# Patient Record
Sex: FEMALE | Race: BLACK OR AFRICAN AMERICAN | NOT HISPANIC OR LATINO | Employment: OTHER | URBAN - METROPOLITAN AREA
[De-identification: names, ages, dates, MRNs, and addresses within clinical notes are randomized per-mention and may not be internally consistent; named-entity substitution may affect disease eponyms.]

---

## 2017-04-27 ENCOUNTER — HISTORICAL (OUTPATIENT)
Dept: LAB | Facility: HOSPITAL | Age: 67
End: 2017-04-27

## 2017-05-31 ENCOUNTER — HISTORICAL (OUTPATIENT)
Dept: INTERNAL MEDICINE | Facility: CLINIC | Age: 67
End: 2017-05-31

## 2017-08-16 ENCOUNTER — HISTORICAL (OUTPATIENT)
Dept: RADIOLOGY | Facility: HOSPITAL | Age: 67
End: 2017-08-16

## 2017-08-16 LAB
BUN SERPL-MCNC: 27 MG/DL (ref 7–18)
CALCIUM SERPL-MCNC: 9.5 MG/DL (ref 8.5–10.1)
CHLORIDE SERPL-SCNC: 108 MMOL/L (ref 98–107)
CO2 SERPL-SCNC: 25 MMOL/L (ref 21–32)
CREAT SERPL-MCNC: 1.7 MG/DL (ref 0.6–1.3)
GLUCOSE SERPL-MCNC: 86 MG/DL (ref 74–106)
POTASSIUM SERPL-SCNC: 4.6 MMOL/L (ref 3.5–5.1)
SODIUM SERPL-SCNC: 143 MMOL/L (ref 136–145)
URATE SERPL-MCNC: 4.9 MG/DL (ref 2.6–6)

## 2017-08-29 ENCOUNTER — HISTORICAL (OUTPATIENT)
Dept: INTERNAL MEDICINE | Facility: CLINIC | Age: 67
End: 2017-08-29

## 2017-08-29 LAB
BUN SERPL-MCNC: 26 MG/DL (ref 7–18)
CALCIUM SERPL-MCNC: 9 MG/DL (ref 8.5–10.1)
CHLORIDE SERPL-SCNC: 106 MMOL/L (ref 98–107)
CO2 SERPL-SCNC: 28 MMOL/L (ref 21–32)
CREAT SERPL-MCNC: 1.4 MG/DL (ref 0.6–1.3)
GLUCOSE SERPL-MCNC: 86 MG/DL (ref 74–106)
POTASSIUM SERPL-SCNC: 4.4 MMOL/L (ref 3.5–5.1)
SODIUM SERPL-SCNC: 139 MMOL/L (ref 136–145)

## 2017-10-31 ENCOUNTER — HISTORICAL (OUTPATIENT)
Dept: RADIOLOGY | Facility: HOSPITAL | Age: 67
End: 2017-10-31

## 2017-12-12 ENCOUNTER — HISTORICAL (OUTPATIENT)
Dept: ENDOSCOPY | Facility: HOSPITAL | Age: 67
End: 2017-12-12

## 2017-12-12 LAB — CRC RECOMMENDATION EXT: NORMAL

## 2017-12-18 ENCOUNTER — HISTORICAL (OUTPATIENT)
Dept: ADMINISTRATIVE | Facility: HOSPITAL | Age: 67
End: 2017-12-18

## 2017-12-18 LAB
ALBUMIN SERPL-MCNC: 3 GM/DL (ref 3.4–5)
ALBUMIN/GLOB SERPL: 1 RATIO (ref 1–2)
ALP SERPL-CCNC: 72 UNIT/L (ref 45–117)
ALT SERPL-CCNC: 14 UNIT/L (ref 12–78)
AST SERPL-CCNC: 11 UNIT/L (ref 15–37)
BILIRUB SERPL-MCNC: 0.2 MG/DL (ref 0.2–1)
BILIRUBIN DIRECT+TOT PNL SERPL-MCNC: <0.1 MG/DL
BILIRUBIN DIRECT+TOT PNL SERPL-MCNC: ABNORMAL MG/DL
BUN SERPL-MCNC: 19 MG/DL (ref 7–18)
CALCIUM SERPL-MCNC: 8.6 MG/DL (ref 8.5–10.1)
CHLORIDE SERPL-SCNC: 108 MMOL/L (ref 98–107)
CO2 SERPL-SCNC: 26 MMOL/L (ref 21–32)
CREAT SERPL-MCNC: 1.1 MG/DL (ref 0.6–1.3)
ERYTHROCYTE [DISTWIDTH] IN BLOOD BY AUTOMATED COUNT: 14.2 % (ref 11.5–14.5)
GLOBULIN SER-MCNC: 4.5 GM/ML (ref 2.3–3.5)
GLUCOSE SERPL-MCNC: 78 MG/DL (ref 74–106)
HAV IGM SERPL QL IA: NONREACTIVE
HBV CORE IGM SERPL QL IA: NONREACTIVE
HBV SURFACE AG SERPL QL IA: NEGATIVE
HCT VFR BLD AUTO: 34.3 % (ref 35–46)
HCV AB SERPL QL IA: NONREACTIVE
HGB BLD-MCNC: 10.5 GM/DL (ref 12–16)
MCH RBC QN AUTO: 26.4 PG (ref 26–34)
MCHC RBC AUTO-ENTMCNC: 30.6 GM/DL (ref 31–37)
MCV RBC AUTO: 86.2 FL (ref 80–100)
PLATELET # BLD AUTO: 294 X10(3)/MCL (ref 130–400)
PMV BLD AUTO: 10.6 FL (ref 7.4–10.4)
POTASSIUM SERPL-SCNC: 3.7 MMOL/L (ref 3.5–5.1)
PROT SERPL-MCNC: 7.5 GM/DL (ref 6.4–8.2)
RBC # BLD AUTO: 3.98 X10(6)/MCL (ref 4–5.2)
SODIUM SERPL-SCNC: 143 MMOL/L (ref 136–145)
T PALLIDUM AB SER QL: NONREACTIVE
WBC # SPEC AUTO: 5.7 X10(3)/MCL (ref 4.5–11)

## 2017-12-21 ENCOUNTER — HISTORICAL (OUTPATIENT)
Dept: SURGERY | Facility: HOSPITAL | Age: 67
End: 2017-12-21

## 2018-05-21 ENCOUNTER — HISTORICAL (OUTPATIENT)
Dept: WOUND CARE | Facility: HOSPITAL | Age: 68
End: 2018-05-21

## 2018-07-02 ENCOUNTER — HISTORICAL (OUTPATIENT)
Dept: INTERNAL MEDICINE | Facility: CLINIC | Age: 68
End: 2018-07-02

## 2018-07-02 LAB
ABS NEUT (OLG): 2.33 X10(3)/MCL (ref 2.1–9.2)
ALBUMIN SERPL-MCNC: 3.3 GM/DL (ref 3.4–5)
ALBUMIN/GLOB SERPL: 1 RATIO (ref 1–2)
ALP SERPL-CCNC: 71 UNIT/L (ref 45–117)
ALT SERPL-CCNC: 13 UNIT/L (ref 12–78)
AST SERPL-CCNC: 17 UNIT/L (ref 15–37)
BASOPHILS # BLD AUTO: 0.03 X10(3)/MCL
BASOPHILS NFR BLD AUTO: 1 %
BILIRUB SERPL-MCNC: 0.3 MG/DL (ref 0.2–1)
BILIRUBIN DIRECT+TOT PNL SERPL-MCNC: 0.1 MG/DL
BILIRUBIN DIRECT+TOT PNL SERPL-MCNC: 0.2 MG/DL
BUN SERPL-MCNC: 16 MG/DL (ref 7–18)
CALCIUM SERPL-MCNC: 8.8 MG/DL (ref 8.5–10.1)
CHLORIDE SERPL-SCNC: 108 MMOL/L (ref 98–107)
CHOLEST SERPL-MCNC: 169 MG/DL
CHOLEST/HDLC SERPL: 3.1 {RATIO} (ref 0–4.4)
CO2 SERPL-SCNC: 26 MMOL/L (ref 21–32)
CREAT SERPL-MCNC: 1.2 MG/DL (ref 0.6–1.3)
EOSINOPHIL # BLD AUTO: 0.06 X10(3)/MCL
EOSINOPHIL NFR BLD AUTO: 1 %
ERYTHROCYTE [DISTWIDTH] IN BLOOD BY AUTOMATED COUNT: 15.1 % (ref 11.5–14.5)
GLOBULIN SER-MCNC: 4.5 GM/ML (ref 2.3–3.5)
GLUCOSE SERPL-MCNC: 69 MG/DL (ref 74–106)
HCT VFR BLD AUTO: 39.6 % (ref 35–46)
HDLC SERPL-MCNC: 55 MG/DL
HGB BLD-MCNC: 12.3 GM/DL (ref 12–16)
IMM GRANULOCYTES # BLD AUTO: 0.01 10*3/UL
IMM GRANULOCYTES NFR BLD AUTO: 0 %
LDLC SERPL CALC-MCNC: 93 MG/DL (ref 0–130)
LYMPHOCYTES # BLD AUTO: 2.23 X10(3)/MCL
LYMPHOCYTES NFR BLD AUTO: 45 % (ref 13–40)
MCH RBC QN AUTO: 26.9 PG (ref 26–34)
MCHC RBC AUTO-ENTMCNC: 31.1 GM/DL (ref 31–37)
MCV RBC AUTO: 86.5 FL (ref 80–100)
MONOCYTES # BLD AUTO: 0.34 X10(3)/MCL
MONOCYTES NFR BLD AUTO: 7 % (ref 4–12)
NEUTROPHILS # BLD AUTO: 2.33 X10(3)/MCL
NEUTROPHILS NFR BLD AUTO: 47 X10(3)/MCL
PLATELET # BLD AUTO: 264 X10(3)/MCL (ref 130–400)
PMV BLD AUTO: 10.1 FL (ref 7.4–10.4)
POTASSIUM SERPL-SCNC: 4 MMOL/L (ref 3.5–5.1)
PROT SERPL-MCNC: 7.8 GM/DL (ref 6.4–8.2)
RBC # BLD AUTO: 4.58 X10(6)/MCL (ref 4–5.2)
SODIUM SERPL-SCNC: 141 MMOL/L (ref 136–145)
TRIGL SERPL-MCNC: 105 MG/DL
URATE SERPL-MCNC: 4.2 MG/DL (ref 2.6–6)
VLDLC SERPL CALC-MCNC: 21 MG/DL
WBC # SPEC AUTO: 5 X10(3)/MCL (ref 4.5–11)

## 2018-08-01 ENCOUNTER — HISTORICAL (OUTPATIENT)
Dept: RADIOLOGY | Facility: HOSPITAL | Age: 68
End: 2018-08-01

## 2018-08-31 ENCOUNTER — HISTORICAL (OUTPATIENT)
Dept: WOUND CARE | Facility: HOSPITAL | Age: 68
End: 2018-08-31

## 2019-01-15 ENCOUNTER — HISTORICAL (OUTPATIENT)
Dept: INTERNAL MEDICINE | Facility: CLINIC | Age: 69
End: 2019-01-15

## 2019-01-15 LAB
ABS NEUT (OLG): 2.26 X10(3)/MCL (ref 2.1–9.2)
ALBUMIN SERPL-MCNC: 3.3 GM/DL (ref 3.4–5)
ALBUMIN/GLOB SERPL: 1 RATIO (ref 1–2)
ALP SERPL-CCNC: 84 UNIT/L (ref 45–117)
ALT SERPL-CCNC: 17 UNIT/L (ref 12–78)
AST SERPL-CCNC: 22 UNIT/L (ref 15–37)
BASOPHILS # BLD AUTO: 0.02 X10(3)/MCL
BASOPHILS NFR BLD AUTO: 0 %
BILIRUB SERPL-MCNC: 0.5 MG/DL (ref 0.2–1)
BILIRUBIN DIRECT+TOT PNL SERPL-MCNC: 0.2 MG/DL
BILIRUBIN DIRECT+TOT PNL SERPL-MCNC: 0.3 MG/DL
BUN SERPL-MCNC: 20 MG/DL (ref 7–18)
CALCIUM SERPL-MCNC: 8.8 MG/DL (ref 8.5–10.1)
CHLORIDE SERPL-SCNC: 107 MMOL/L (ref 98–107)
CHOLEST SERPL-MCNC: 196 MG/DL
CHOLEST/HDLC SERPL: 3.4 {RATIO} (ref 0–4.4)
CO2 SERPL-SCNC: 27 MMOL/L (ref 21–32)
CREAT SERPL-MCNC: 1.2 MG/DL (ref 0.6–1.3)
EOSINOPHIL # BLD AUTO: 0.12 10*3/UL
EOSINOPHIL NFR BLD AUTO: 2 %
ERYTHROCYTE [DISTWIDTH] IN BLOOD BY AUTOMATED COUNT: 13.6 % (ref 11.5–14.5)
GLOBULIN SER-MCNC: 5 GM/ML (ref 2.3–3.5)
GLUCOSE SERPL-MCNC: 98 MG/DL (ref 74–106)
HCT VFR BLD AUTO: 43.4 % (ref 35–46)
HDLC SERPL-MCNC: 57 MG/DL
HGB BLD-MCNC: 13.5 GM/DL (ref 12–16)
IMM GRANULOCYTES # BLD AUTO: 0.01 10*3/UL
IMM GRANULOCYTES NFR BLD AUTO: 0 %
LDLC SERPL CALC-MCNC: 119 MG/DL (ref 0–130)
LYMPHOCYTES # BLD AUTO: 2.54 X10(3)/MCL
LYMPHOCYTES NFR BLD AUTO: 48 % (ref 13–40)
MCH RBC QN AUTO: 27.2 PG (ref 26–34)
MCHC RBC AUTO-ENTMCNC: 31.1 GM/DL (ref 31–37)
MCV RBC AUTO: 87.3 FL (ref 80–100)
MONOCYTES # BLD AUTO: 0.32 X10(3)/MCL
MONOCYTES NFR BLD AUTO: 6 % (ref 4–12)
NEUTROPHILS # BLD AUTO: 2.26 X10(3)/MCL
NEUTROPHILS NFR BLD AUTO: 43 X10(3)/MCL
PLATELET # BLD AUTO: 289 X10(3)/MCL (ref 130–400)
PMV BLD AUTO: 10 FL (ref 7.4–10.4)
POTASSIUM SERPL-SCNC: 4 MMOL/L (ref 3.5–5.1)
PROT SERPL-MCNC: 8.3 GM/DL (ref 6.4–8.2)
RBC # BLD AUTO: 4.97 X10(6)/MCL (ref 4–5.2)
SODIUM SERPL-SCNC: 140 MMOL/L (ref 136–145)
TRIGL SERPL-MCNC: 100 MG/DL
VLDLC SERPL CALC-MCNC: 20 MG/DL
WBC # SPEC AUTO: 5.3 X10(3)/MCL (ref 4.5–11)

## 2019-05-06 ENCOUNTER — HISTORICAL (OUTPATIENT)
Dept: ADMINISTRATIVE | Facility: HOSPITAL | Age: 69
End: 2019-05-06

## 2019-08-07 ENCOUNTER — HISTORICAL (OUTPATIENT)
Dept: INTERNAL MEDICINE | Facility: CLINIC | Age: 69
End: 2019-08-07

## 2019-08-07 LAB
ABS NEUT (OLG): 3.32 X10(3)/MCL (ref 2.1–9.2)
ALBUMIN SERPL-MCNC: 3.3 GM/DL (ref 3.4–5)
ALBUMIN/GLOB SERPL: 0.8 RATIO (ref 1.1–2)
ALP SERPL-CCNC: 74 UNIT/L (ref 45–117)
ALT SERPL-CCNC: 15 UNIT/L (ref 12–78)
AST SERPL-CCNC: 20 UNIT/L (ref 15–37)
BASOPHILS # BLD AUTO: 0.03 X10(3)/MCL
BASOPHILS NFR BLD AUTO: 0 %
BILIRUB SERPL-MCNC: 0.4 MG/DL (ref 0.2–1)
BILIRUBIN DIRECT+TOT PNL SERPL-MCNC: 0.1 MG/DL
BILIRUBIN DIRECT+TOT PNL SERPL-MCNC: 0.3 MG/DL
BUN SERPL-MCNC: 19 MG/DL (ref 7–18)
CALCIUM SERPL-MCNC: 9.4 MG/DL (ref 8.5–10.1)
CHLORIDE SERPL-SCNC: 110 MMOL/L (ref 98–107)
CHOLEST SERPL-MCNC: 201 MG/DL
CHOLEST/HDLC SERPL: 2.9 {RATIO} (ref 0–4.4)
CO2 SERPL-SCNC: 27 MMOL/L (ref 21–32)
CREAT SERPL-MCNC: 1.2 MG/DL (ref 0.6–1.3)
EOSINOPHIL # BLD AUTO: 0.07 X10(3)/MCL
EOSINOPHIL NFR BLD AUTO: 1 %
ERYTHROCYTE [DISTWIDTH] IN BLOOD BY AUTOMATED COUNT: 14.6 % (ref 11.5–14.5)
GLOBULIN SER-MCNC: 4.2 GM/ML (ref 2.3–3.5)
GLUCOSE SERPL-MCNC: 101 MG/DL (ref 74–106)
HCT VFR BLD AUTO: 42 % (ref 35–46)
HDLC SERPL-MCNC: 70 MG/DL
HGB BLD-MCNC: 12.7 GM/DL (ref 12–16)
IMM GRANULOCYTES # BLD AUTO: 0.01 10*3/UL
IMM GRANULOCYTES NFR BLD AUTO: 0 %
LDLC SERPL CALC-MCNC: 116 MG/DL (ref 0–130)
LYMPHOCYTES # BLD AUTO: 2.49 X10(3)/MCL
LYMPHOCYTES NFR BLD AUTO: 39 % (ref 13–40)
MCH RBC QN AUTO: 28.2 PG (ref 26–34)
MCHC RBC AUTO-ENTMCNC: 30.2 GM/DL (ref 31–37)
MCV RBC AUTO: 93.3 FL (ref 80–100)
MONOCYTES # BLD AUTO: 0.44 X10(3)/MCL
MONOCYTES NFR BLD AUTO: 7 % (ref 0–24)
NEUTROPHILS # BLD AUTO: 3.32 X10(3)/MCL
NEUTROPHILS NFR BLD AUTO: 52 X10(3)/MCL
PLATELET # BLD AUTO: 265 X10(3)/MCL (ref 130–400)
PMV BLD AUTO: 9.6 FL (ref 7.4–10.4)
POTASSIUM SERPL-SCNC: 4.2 MMOL/L (ref 3.5–5.1)
PROT SERPL-MCNC: 7.5 GM/DL (ref 6.4–8.2)
RBC # BLD AUTO: 4.5 X10(6)/MCL (ref 4–5.2)
SODIUM SERPL-SCNC: 141 MMOL/L (ref 136–145)
TRIGL SERPL-MCNC: 76 MG/DL
VLDLC SERPL CALC-MCNC: 15 MG/DL
WBC # SPEC AUTO: 6.4 X10(3)/MCL (ref 4.5–11)

## 2019-09-24 ENCOUNTER — HISTORICAL (OUTPATIENT)
Dept: RADIOLOGY | Facility: HOSPITAL | Age: 69
End: 2019-09-24

## 2020-12-03 ENCOUNTER — HISTORICAL (OUTPATIENT)
Dept: ADMINISTRATIVE | Facility: HOSPITAL | Age: 70
End: 2020-12-03

## 2020-12-03 LAB
BUN SERPL-MCNC: 17 MG/DL (ref 9.8–20.1)
CALCIUM SERPL-MCNC: 9.1 MG/DL (ref 8.4–10.2)
CHLORIDE SERPL-SCNC: 103 MMOL/L (ref 98–107)
CO2 SERPL-SCNC: 25 MMOL/L (ref 23–31)
CREAT SERPL-MCNC: 1.06 MG/DL (ref 0.55–1.02)
CREAT/UREA NIT SERPL: 16
GLUCOSE SERPL-MCNC: 85 MG/DL (ref 82–115)
POTASSIUM SERPL-SCNC: 4.3 MMOL/L (ref 3.5–5.1)
SODIUM SERPL-SCNC: 134 MMOL/L (ref 136–145)

## 2021-06-22 ENCOUNTER — HISTORICAL (OUTPATIENT)
Dept: RADIOLOGY | Facility: HOSPITAL | Age: 71
End: 2021-06-22

## 2021-07-06 ENCOUNTER — HISTORICAL (OUTPATIENT)
Dept: CARDIOLOGY | Facility: CLINIC | Age: 71
End: 2021-07-06

## 2021-07-06 LAB
ALBUMIN SERPL-MCNC: 3.4 GM/DL (ref 3.4–4.8)
ALBUMIN/GLOB SERPL: 0.8 RATIO (ref 1.1–2)
ALP SERPL-CCNC: 74 UNIT/L (ref 40–150)
ALT SERPL-CCNC: 14 UNIT/L (ref 0–55)
AST SERPL-CCNC: 23 UNIT/L (ref 5–34)
BILIRUB SERPL-MCNC: 0.6 MG/DL
BILIRUBIN DIRECT+TOT PNL SERPL-MCNC: 0.2 MG/DL (ref 0–0.5)
BILIRUBIN DIRECT+TOT PNL SERPL-MCNC: 0.4 MG/DL (ref 0–0.8)
BUN SERPL-MCNC: 15.8 MG/DL (ref 9.8–20.1)
CALCIUM SERPL-MCNC: 10 MG/DL (ref 8.4–10.2)
CHLORIDE SERPL-SCNC: 108 MMOL/L (ref 98–107)
CHOLEST SERPL-MCNC: 219 MG/DL
CHOLEST/HDLC SERPL: 3 {RATIO} (ref 0–5)
CO2 SERPL-SCNC: 23 MMOL/L (ref 23–31)
CREAT SERPL-MCNC: 0.98 MG/DL (ref 0.55–1.02)
GLOBULIN SER-MCNC: 4.5 GM/DL (ref 2.4–3.5)
GLUCOSE SERPL-MCNC: 96 MG/DL (ref 82–115)
HDLC SERPL-MCNC: 63 MG/DL (ref 35–60)
LDLC SERPL CALC-MCNC: 132 MG/DL (ref 50–140)
POTASSIUM SERPL-SCNC: 3.6 MMOL/L (ref 3.5–5.1)
PROT SERPL-MCNC: 7.9 GM/DL (ref 5.8–7.6)
SODIUM SERPL-SCNC: 143 MMOL/L (ref 136–145)
TRIGL SERPL-MCNC: 119 MG/DL (ref 37–140)
VLDLC SERPL CALC-MCNC: 24 MG/DL

## 2022-03-24 ENCOUNTER — HISTORICAL (OUTPATIENT)
Dept: ADMINISTRATIVE | Facility: HOSPITAL | Age: 72
End: 2022-03-24

## 2022-04-11 ENCOUNTER — HISTORICAL (OUTPATIENT)
Dept: ADMINISTRATIVE | Facility: HOSPITAL | Age: 72
End: 2022-04-11

## 2022-04-27 VITALS
HEIGHT: 64 IN | BODY MASS INDEX: 40.49 KG/M2 | SYSTOLIC BLOOD PRESSURE: 136 MMHG | WEIGHT: 237.19 LBS | DIASTOLIC BLOOD PRESSURE: 72 MMHG | OXYGEN SATURATION: 96 %

## 2022-04-30 NOTE — PROGRESS NOTES
Patient:   Shelby Larios            MRN: [id number]            FIN: [id number]               Age:   67 years     Sex:  Female     :  1950   Associated Diagnoses:   None   Author:   Lars EVANS, Jessica Conway reviewed: Will discuss with patient during follow up appointment on  2018 at 7:30am.

## 2022-04-30 NOTE — OP NOTE
Patient:   Shelby Larios            MRN: [id number]            FIN: [id number]               Age:   67 years     Sex:  Female     :  1950   Associated Diagnoses:   None   Author:   Jj Garrison MD      Procedure   Colonoscopy procedure   Physical Exam: vital signs Vital Signs   2017 7:47 CST      Temperature Oral          36.6 DegC                             Temperature Oral (calculated)             97.88 DegF                             Peripheral Pulse Rate     52 bpm  LOW                             Respiratory Rate          20 br/min                             SpO2                      99 %                             Oxygen Therapy            Room air                             Systolic Blood Pressure   193 mmHg  HI                             Diastolic Blood Pressure  98 mmHg  HI                             Mean Arterial Pressure, Cuff              130 mmHg     .        Impression and Plan   DATE OF PROCEDURE:  17    PATIENT NAME: Shelby Larios    MRN: [id number]  PREOPERATIVE DIAGNOSIS:  Rectal bleeding  POSTOPERATIVE DIAGNOSIS:  internal hemorrhoids, colon polyps  PROCEDURE PERFORMED:    Colonoscopy  ENDOSCOPIST:  Jared Nesbitt MD  ASSISTANT:  Jj Garrison MD  ANESTHESIA:  Deep Sedation  EXTENT OF EXAM:  To the cecum.  EBL: none  PREPARATION:  Good  LIMITATIONS:  None.  INDICATIONS FOR EXAMINATION:  The patient is a 68yo F Hx rectal bleeding, here for colonoscopy  PROCEDURE IN DETAIL:  A physical examination was performed. The major risks and benefits associated with the procedure were explained to the patient in detail. The patient verbalized understanding and agreement with the same.  The patient was connected to the appropriate monitoring devices and an IV was started. Continuous oxygen was provided via nasal cannula and intravenous sedation was administered in divided doses throughout the procedure.   After adequate sedation was achieved, the patient was placed in the left  lateral decubitus position and a digital rectal exam was performed. This examination was within normal limits. A well-lubricated colonoscope was then inserted into the rectum and advanced under direct visualization to the level of the cecum. The cecum was identified by both visual and anatomic landmarks. A photograph was taken of the cecal cap, as well as the intussuscepting ileum. The scope was then fully withdrawn while examining the color, texture, anatomy and integrity of the mucosa from the cecum to the anal canal. The findings were consistent with colon polyps, 1 at 100cm removed by cold forceps, ~1cm. Multiple small <0.5cm polyps seen in rectum ~15cm, cold forceps Biopsy performed. The scope was then retroflexed to view the dentate line. Internal hemorrhoids. The scope was completely retrieved upon exiting the anal canal and the procedure was terminated. The patient was then transferred to the recovery room in stable condition.  ENDOSCOPIC DIAGNOSIS:  internal hemorrhoids, colon polyps  RECOMMENDATIONS:  Follow up in 5-10 years for repeat colonoscopy. F/u 2 weeks for Bx results.

## 2022-05-04 NOTE — HISTORICAL [facility] CERNER
This is a historical note converted from [employer]. Formatting and pictures may have been removed.  Please reference [employer] for original formatting and attached multimedia. Admission Information  66yo  with postmenopausal bleeding, 12mm endometrial stripe on pelvic ultrasound, and an endometrial biopsy showing proliferative tissue.  Hospital Course  Significant Findings  EUA: Morbidly obese female, normal external genitalia without lesions or masses. EUA revealed moderate vaginal capacity and a?large, anterior, multiparous?cervix overall normal to palpation. Uterus anteverted, ~7 weeks in size, mobile. No palpable adnexal masses but exam limited by body habitus.?  ?   Hysteroscope: normal appearing endocervical canal. Endometrial cavity with disordered proliferative and secretory-appearing endometrium with micropolyps noted throughout the cavity.?Both ostia visualized.  Procedures and Treatment Provided  Patient underwent uncomplicated Hysteroscopy Dilation and Curettage with Mirena IUD Insertion. She was awakened from anesthesia and was discharged home after meeting all criteria.  Physical Exam  Vitals & Measurements  T:?37.4? ?C ?(Axillary)? TMIN:?36? ?C ?(Temporal Artery)? TMAX:?37.4? ?C ?(Axillary)? HR:?47?(Monitored)? RR:?14? BP:?130/73? SpO2:?100%? WT:?103.1?kg?  General: NAD, A/O x3  Lungs: CTAB, non-labored breathing  CV: RRR, extremities well-perfused  Ab: soft, non-tender  : no vaginal bleeding or discharge  Ext: well-perfused, calves non-tender bilaterally  Discharge Plan  --Mirena IUD in place for treatment of postmenopausal bleeding.  --Discharge medications: Ibuprofen, Percocet  --Post-op appointment scheduled for 2018 at 0830. Pathology will be discussed at that time.  Postmenopausal bleeding  Orders:  levonorgestrel, 52 mg, form: Device, Intrauteral, Once-Unscheduled, first dose 17 6:51:00 CST, [id number]  Pathology Tissue [facility], 17 9:13:00 CST, AP Specimen, 68 yo  with  postmenopausal bleeding presents for preoperative assessment. Bleeding started in June/July with light spotting, increased to 2-3 pads/day for two weeks in September, then taperd off to light spotting...  Patient Discharge Condition  stable for discharge home

## 2022-05-04 NOTE — HISTORICAL [facility] CERNER
This is a historical note converted from [employer]. Formatting and pictures may have been removed.  Please reference [employer] for original formatting and attached multimedia. Patient:?Shelby Larios (MRN [id number])  ?  HPI:?68 yo  with postmenopausal bleeding presents for preoperative assessment.?Bleeding started?in /July?with light spotting, increased to 2-3 pads/day for two weeks in September, then taperd off to light spotting again. She has been evaluated with a pelvic ultrasound, demonstrating a 12mm endometrial stripe; and an endometrial biopsy, which was proliferative with no evidence of malignancy. She has had continued spotting to light bleeding since the endometrial biopsy.  ?   Discussed patients CAD and functional status:?denies chest pain at rest or with exertion. Able to participate in activities of daily living without issue. Can walk 2-3 city blocks prior to feeling winded. Cannot climb flight of stairs without becoming short of breath.  ?  PMH:?HTN, CKD,?Gout, VIC, CAD,?Class III?Obesity?(BMI 42)  PSH:?BTL  OBHx:? x 6 (BB <5#), patient reports some  and some term deliveries  GynHx:?LMP early 40s;?denies history of abnormal pap or STI  FHx:?Mother with history of MI, colon cancer at age 68. Father with HTN and throat?cancer.?Sister with throat cancer. Denies family history of breast, ovarian, or endometrial cancer  Social:?social EtOH on weekends, denies tobacco or illicit drug use  All:?NKDA  Meds: allopurinol, atenolol, biotin, losartan  ?  Objective:  T: 36.8? BP: 190/82? P: 66? R: 20? Wt: 104 kg? Ht: 157 cm ?BMI:?42  NAD, A&O x 3  RRR  CTAB  Abd soft, nondistended, nontender, normoactive bowel sounds   Normal external genitalia, vulva without lesions. Normal appearing urethral meatus. Normal appearing cervix without lesions. No discharge or blood in the vaginal vault. No CMT, no bilateral adnexal fullness/tenderness. 12 week size mobile uterus with excellent descent and capacity,  anteverted. Nontender on examination.  ?  Labs:?  Pap?no longer indicated  EMB?Proliferative phase endometrium with progesterone effect.?No evidence of malignancy.?(2017)  MMG?BIRADS 2 (2017)  CXR ordered?  A1c 5.5 (2017)  Cr 1.2 (10/2017)  H/H 12.1/38 (10/2017)  Scheduled for colonoscopy   ?  Imaging:?  US Pelvis 10/31/2017  Imaging is limited on the transabdominal images by incomplete filling?of the urinary bladder to provide an acoustic window. The uterus?measures 6.7 x 4.0 x 4.3 cm. There is significant thickening of the?endometrial stripe for patient age and postmenopausal status measuring?12 mm. Considerations for this finding include endometrial hyperplasia?or possible neoplasm. The left ovary appears unremarkable containing?small follicular cysts. The right ovary was not visualized. No free pelvic fluid is identified.  IMPRESSION:  1. Thickening of the endometrial stripe for patient age measuring 12?mm. Findings indicate endometrial hyperplasia or possible neoplasm.  2. No free pelvic fluid or adnexal abnormalities are identified?although the right ovary was not?visualized.  ?  Assessment:?66 yo  with postmenopausal bleeding here for preoperative assessment.?  ?  Counseling:?Alternatives to this planned procedure were explained to the patient including expectant, medical and other surgical management. This procedure and its risks, reasons, benefits and complications (including injury to bowel, bladder, major blood vessel, ureter, bleeding, possibility of transfusion, infection, scarring, dyspareunia, erosion, further surgery, incontinence, failure of the procedure or fistula formation) were reviewed in detail. Additional risks specific to this patient and procedure include?uterine perforation with possible injury to viscera or vasculature, fluid overload, failure to diagnose causative pathology, possible diagnosis of cancer.?Patient counseled on risk of blood transfusion including but not  limited to allergic reaction, transmission HIV, Hep C 1:2million, transmission Hep B 1:250,000.?Patient counseled on concurrent placement of Mirena IUD for endometrial protection if endometrium is benign, for treatment if endometrial intraepithelial neoplasia is disagnosed. Discussed risks of Mirena placement, including perforation, malplacement, expulsion, need for further procedures or surgical removal. After counseling, patient desires Mirena placement at time of surgery.  ?  Plan:  - Surgical consents signed. Preop testing ordered. Surgery case requested. Instructions reviewed, including NPO after midnight. Patient given date and time for EAC appointment.  - Follow-up pre-op labs, including EKG and CXR  - Type and screen?on?morning of surgery.  - No antibiotics indicated for this procedure.?  - SCDs for DVT prophylaxis.  - Continue atenolol through AM of surgery  - Surgical plan: Hysteroscopy D&C and Mirena IUD placement?on 12/21/2017 with Dr. Salazar  - Postop appt: 1/5/2018 at 0830  ?  ?  Addendum by Martin EVANS, May S on December 11, 2017 11:58:04 CST (Verified)  I have seen this patient on 12/6/17?and agree with note above.  ? Has colonoscopy scheduled next week.  Informed consent obtained, specifically I reiterated the risks of: ?bleeding, uterine perforation, inability to obtain adequate sample, finding of malignancy, medical conditions such as heart attack or stroke due to having general anesthetic, or other unpredicted risks.??  Patient able to describe planned procedure in her own words.  Expected recovery time reviewed, as well as normal postoperative course.  ?   UPDATE:  Patient has been seen this morning. Since her pre-op visit, she was seen in medicine clinic on 12/18 and provided with a prednisone taper for an acute gout flare-up of her left ankle.?She did not take the prednisone this morning.?She reports that there have been no other changes in her medical history since the above H&P. She is  able to describe the planned procedure in her own words and all questions have been answered. The consents are signed and on the chart. Plan to proceed to the OR for a Hysteroscopy D&C and Mirena IUD Placement with Dr. Salazar.  ?   Katie Lopez MD  [facility], HO[facility]2   I have seen the patient today preoperatively and she is able to state procedure in her own words.? States she wishes everything was out.? We reviewed that uterus will remain, and she is agreeable to plan?for IUD placement.

## 2022-05-04 NOTE — HISTORICAL [facility] CERNER
This is a historical note converted from [employer]. Formatting and pictures may have been removed.  Please reference [employer] for original formatting and attached multimedia. Chief Complaint  Advance DJD bilat knee, pt requests CSI injection recieved last CSI in 2017  History of Present Illness  68?yo?RHD??[?female ]?nonsmoker??presents to?Ortho Clinic??for routine follow up?for??bilateral knee pain.??  BRENDA: none  Previously seen 01/2018?_.  Previous treatment:?CSI on 09/2017, referred to PT, patient did not go  Previous injuries:?recent falls due to knee giving out and ER visit with xrays  Current pain level: 1/10 (rated as?mild/)??without pain medication; pain has recently began to worsen over the last 3 months  Associated Symptoms:?no numbness or tingling/numbness;??no swelling/swelling noted;?no skin changes;?no weakness;?mild decrease in ROM  Current Activity Level: walks daily without any restrictions but fights through the knee pain  ?  Review of Systems  Constitutional: no fever, no chills, no weight loss  CV: no swelling, no edema  GI: no urinary retention, no urinary incontinence  : no fecal incontinence  Skin: no rash, no wound  Neuro: no numbness/tingling, no weakness, no saddle anesthesia  MSK: as above  Psych: no depression, no anxiety  Heme/Lymph: no easy bruising, no easy bleeding, no lymphadenopathy  ?  Physical Exam  Vitals & Measurements  T:?37.0? ?C (Oral)? HR:?79(Peripheral)? RR:?16? BP:?184/82?  HT:?158?cm? WT:?105.5?kg? BMI:?42.26?  General:? well developed; NAD,  at bedside  HEENT:? oral mucosa moist, no pharyngeal erythema, EOMI,  CV:? 2+ pulses on all 4 extremities  Resp:? non-labored breathing, symmetrical chest expansion bilaterally  Abd:? soft, no TTP,  MSK:? 5/5 strength in all 4 extremities, see below  Skin:? warm, dry, pink, no rashes or sores  Neuro:? AAOx4, appropriate mood and affect  ?  R Knee:  Inspection:?antalgic gait, full weightbearing, normal alignment,?mild swelling, no  erythema, no bruising, no atrophy  Palpation:?mild tenderness to palpation  ROM:?  Flexion (0-140):?90 degrees  Extension:?10 degrees  Crepitus: pos  Strength: Flexion 5/5, Extension 5/5  Neurovascular: 2+ distal pulse bilaterally, sensation intact  ?   Special Tests:  Ballotable Effusion: Negative  Fluid Wave: Negative  Patellar Compression: Negative  Medial Retinaculum: non tender  Lateral Retinaculum: non tender  Lachmans: Negative  Posterior Drawer: Negative  McMurrays: Negative  Varus Stress: no laxity  Valgus Stress: no laxity  ?   L Knee:  Inspection:?antalgic gait, full weightbearing, normal alignment, no swelling, no erythema, no bruising, no atrophy  Palpation: No tenderness to palpation?  ROM:?  Flexion (0-140):?100 degrees  Extension:?10 degrees  Crepitus: negative  Strength: Flexion 5/5, Extension 5/5  Neurovascular: 2+ distal pulse bilaterally, sensation intact  ?   Special Tests:  Ballotable Effusion: Negative  Fluid Wave: Negative  Patellar Compression: Negative  Medial Retinaculum: non tender  Lateral Retinaculum: non tender  Lachmans: Negative  Posterior Drawer: Negative  McMurrays: Negative  Apleys Compression: Negative  Varus Stress: no laxity  Valgus Stress: no laxity  ?  Assessment/Plan  DJD (degenerative joint disease) of knee?M17.10  -xrays? ordered and?interpreted by me ?with attending physician;?  -Activity: full weight bearing exercise as tolerated by patient  -PT prescription 3 times weekly for 12 weeks; Home exercise prescription provided; ICE/heat prn  -CSI in bilateral knees, see procedure note; patient consented prior to; neuro vascular exam normal after, patient tolerated well  -RTC in 6 months  ?  HTN (hypertension)?I10  ?184/82  Patient states she did not take meds this morning but states her BP is never controlled  encouraged patient to see pcp very soon to address BP control  Obesity?E66.9  ?discussed calorie restriction and importance of weight loss on slowing DJD  process  Encouraged exercise as tolerated by patient  RTC in?6 months?or sooner if pain returns   Problem List/Past Medical History  Ongoing  Chronic kidney disease  Gout  Hypertension  Morbid obesity  Vaginal bleeding problem  Historical  No qualifying data  Procedure/Surgical History  Extraction of Endometrium, Via Natural or Artificial Opening, Diagnostic (12/21/2017)  Hysteroscopic Dilation & Curettage (None) (12/21/2017)  Hysteroscopy, surgical; with sampling (biopsy) of endometrium and/or polypectomy, with or without D & C (12/21/2017)  Insertion of Contraceptive Device into Uterus, Via Natural or Artificial Opening Endoscopic (12/21/2017)  Insertion of intrauterine device (IUD) (12/21/2017)  Inspection of Uterus and Cervix, Via Natural or Artificial Opening Endoscopic (12/21/2017)  Biopsy Gastrointestinal (12/12/2017)  Colonoscopy (12/12/2017)  Colonoscopy, flexible; with biopsy, single or multiple (12/12/2017)  Excision of Rectum, Via Natural or Artificial Opening Endoscopic (12/12/2017)  Excision of Transverse Colon, Via Natural or Artificial Opening Endoscopic (12/12/2017)  HOSPITAL OBSERVATION SERVICE, PER HOUR (12/13/2015)  Bilateral tubal ligation (1971)   Medications  allopurinol 300 mg oral tablet, 300 mg= 1 tab(s), Oral, BID  amoxicillin-clavulanate 875 mg-125 mg oral tablet, 1 tab(s), Oral, BID,? ?Not Taking, Completed Rx  candesartan 32 mg oral tablet, 32 mg= 1 tab(s), Oral, Daily, 6 refills  hydrALAZINE 25 mg oral tablet, 25 mg= 1 tab(s), Oral, BID, 3 refills  Kenalog-40 injectable suspension, 40 mg= 1 mL, Intra-Articular, Once  Kenalog-40 injectable suspension, 40 mg= 1 mL, Intra-Articular, Once  Lidocaine 1% PF 5ml inj, 5 mL, Intra-Articular, Once  Lidocaine 1% PF 5ml inj, 5 mL, Intra-Articular, Once  metoprolol tartrate 25 mg oral tab, 25 mg= 1 tab(s), Oral, BID, 6 refills  traMADol 50 mg oral tablet, 50 mg= 1 tab(s), Oral, q6hr, PRN,? ?Not Taking, Completed Rx  Allergies  No Known  Allergies  Social History  Alcohol - Denies Alcohol Use, 2015  Current, Beer, 1-2 times per week, 01/15/2019  Employment/School  Retired, 2018  Exercise - Does not exercise, 2015  Self assessment: Fair condition., 09/10/2015  Home/Environment  Lives with Spouse. Living situation: Home/Independent. Alcohol abuse in household: No. Substance abuse in household: No. Smoker in household: No. Injuries/Abuse/Neglect in household: No. Feels unsafe at home: No. Safe place to go: Yes. Family/Friends available for support: Yes. Concern for family members at home: No. Major illness in household: No. Financial concerns: No. TV/Computer concerns: No., 01/15/2019  Nutrition/Health  Regular, Wants to lose weight: No. Sleeping concerns: No. Feels highly stressed: No., 01/15/2019  Sexual  Sexual orientation: Straight or heterosexual. Gender Identity Identifies as female., 2019  Substance Abuse - Denies Substance Abuse, 2015  Never, 2016  Tobacco - Denies Tobacco Use, 2015  Never (less than 100 in lifetime), N/A, 2019  Family History  : Mother and Father.  Diabetes mellitus type 2: Sister.  Heart attack: Mother.  Heart disease: Sister.  Heart failure.: Mother.  Hypertension.: Mother, Father and Sister.  Immunizations  Vaccine Date Status   tetanus/diphtheria/pertussis, acel(Tdap) 01/15/2019 Given   influenza virus vaccine, inactivated 01/15/2019 Given   influenza virus vaccine, inactivated 2017 Given   influenza virus vaccine, inactivated 2015 Given   pneumococcal 23-polyvalent vaccine 2015 Given   Health Maintenance  Health Maintenance  ???Pending?(in the next year)  ??? ??OverDue  ??? ? ? ?Pneumococcal Vaccine due??and every?  ??? ? ? ?Advance Directive due??19??and every 1??year(s)  ??? ??Due?  ??? ? ? ?Aspirin Therapy for CVD Prevention due??19??and every 1??year(s)  ??? ? ? ?Bone Density Screening due??19??Variable frequency  ??? ? ?  ?Coronary Artery Disease Maintenance-Antiplatelet Agent Prescribed due??05/06/19??and every?  ??? ? ? ?Coronary Artery Disease Maintenance-Lipid Lowering Therapy due??05/06/19??and every?  ??? ? ? ?Zoster Vaccine due??05/06/19??and every 100??year(s)  ??? ??Due In Future?  ??? ? ? ?Alcohol Misuse Screening not due until??01/01/20??and every 1??year(s)  ??? ? ? ?Cognitive Screening not due until??01/01/20??and every 1??year(s)  ??? ? ? ?Fall Risk Assessment not due until??01/01/20??and every 1??year(s)  ??? ? ? ?Functional Assessment not due until??01/01/20??and every 1??year(s)  ??? ? ? ?Geriatric Depression Screening not due until??01/01/20??and every 1??year(s)  ??? ? ? ?Obesity Screening not due until??01/01/20??and every 1??year(s)  ??? ? ? ?Hypertension Management-BMP not due until??01/15/20??and every 1??year(s)  ??? ? ? ?Hypertension Management-Blood Pressure not due until??05/05/20??and every 1??year(s)  ???Satisfied?(in the past 1 year)  ??? ??Satisfied?  ??? ? ? ?ADL Screening on??05/06/19.??Satisfied by Hypolite, Melissa F  ??? ? ? ?Alcohol Misuse Screening on??05/06/19.??Satisfied by Hypolite, Melissa F  ??? ? ? ?Blood Pressure Screening on??05/06/19.??Satisfied by Hypolite, Melissa F  ??? ? ? ?Body Mass Index Check on??05/06/19.??Satisfied by Hypolite, Melissa F  ??? ? ? ?Breast Cancer Screening ([facility]) on??08/31/18.??Satisfied by Shikha Ramos  ??? ? ? ?Cervical Cancer Screening on??05/10/18.??Satisfied by Elisabeth Silver.  ??? ? ? ?Cognitive Screening on??05/06/19.??Satisfied by Melissa Vuong F  ??? ? ? ?Depression Screening on??05/06/19.??Satisfied by Hypolite, Melissa F  ??? ? ? ?Diabetes Screening on??01/15/19.??Satisfied by Maryan Calvillo  ??? ? ? ?Fall Risk Assessment on??05/06/19.??Satisfied by Carolann Melissa F  ??? ? ? ?Functional Assessment on??01/15/19.??Satisfied by Opal Medina LPN  ??? ? ? ?Geriatric Depression Screening on??01/15/19.??Satisfied by Opal Medina LPN  ??? ? ?  ?Hypertension Management-Blood Pressure on??05/06/19.??Satisfied by Melissa Vuong  ??? ? ? ?Influenza Vaccine on??01/15/19.??Satisfied by Opal Medina LPN  ??? ? ? ?Lipid Screening on??01/15/19.??Satisfied by Maryan Calvillo  ??? ? ? ?Obesity Screening on??05/06/19.??Satisfied by eMlissa Vuong  ??? ? ? ?Tetanus Vaccine on??01/15/19.??Satisfied by Opal Medina LPN  ??? ??Postponed?  ??? ? ? ?Tetanus Vaccine on??07/25/18.??Recorded by Opal Medina LPN  ?  ?  Diagnostic Results  XR R Knee 5/6/19:  diffuse tricompartment OA changes. no acute fx or dislocation.  ?  XR L Knee 5/6/19:  diffuse tricompartment OA changes. no acute fx or dislocation.      Discussed with the resident at time of visit? Patient chart reviewed. Patient seen and evaluated at time of visit.?HPI, PE, and Assessment and Plan reviewed. Treatment plan is reasonable and appropriate.? All questions were answered.?Compliance with treatment plan is appropriate.  ?Radiology images independently reviewed and agree with radiologist. ?Radiology images independently reviewed and agree with resident.

## 2022-05-04 NOTE — HISTORICAL [facility] CERNER
This is a historical note converted from [employer]ner. Formatting and pictures may have been removed.  Please reference [employer]ner for original formatting and attached multimedia. Indication for Surgery: 66yo  with postmenopausal bleeding, 12mm endometrial stripe on pelvic ultrasound, and an endometrial biopsy showing proliferative tissue.  ?  Preoperative Diagnosis: postmenopausal bleeding, thickened endometrial stripe?  ?   Postoperative Diagnosis: postmenopausal bleeding, thickened endometrial stripe?  ?   Operation: Hysteroscopy Dilation and Curettage, Mirena IUD Insertion?  ?   Surgeon(s): RUDDY Lopez MD?  Assistant: Esther Pascal MD  Attending: Ayaka Salazar MD?  ?  Anesthesia: general?  ?  Estimated Blood Loss: <5mL?  Urine Output: 350mL  IV Fluids: 700mL  ?  Hysteroscopy Distension Media (normal saline):  In: 560mL  Out: 500mL  Deficit: 60mL?  ?  Findings:?  EUA: Morbidly obese female, normal external genitalia without lesions or masses. EUA revealed moderate vaginal capacity and a?large, anterior, multiparous?cervix overall normal to palpation. Uterus anteverted, ~7 weeks in size, mobile. No palpable adnexal masses but exam limited by body habitus.?  ?  Hysteroscope: normal appearing endocervical canal. Endometrial cavity with disordered proliferative and secretory-appearing endometrium with micopolyps noted throughout the cavity.?Both ostia visualized.  ?  Specimen: endometrial curettings  ?  Complications: none  ?  Technique:  The patient was taken to the operating room with IV fluids running. General anesthesia was obtained without difficulty. The patient was prepped and draped in the routine fashion in the dorsal lithotomy position with legs in Fabián type stirrups. Red rubber catheterization was performed and the bladder was drained.?  ?  Procedure:  A weighted speculum was inserted into the vagina and the cervix was visualized. A tenaculum was placed on the anterior aspect of the cervix. The  hysteroscope was introduced through the cervix under direct visualization. The uterine cavity was inspected with findings as listed above. The hysteroscope was removed. A sharp curette was introduced through the cervix and into the endometrial cavity, and the cavity was scraped in a clockwise fashion until scrapings were obtained from all four quadrants. The curette was then removed and scrapings were handed off to be sent to pathology. IUD was inserted to the uterine fundus and deployed without difficulty. The hysteroscope was placed back into the endometrial cavity and correct placement of IUD was confirmed. All instruments were removed from the vagina. Tenaculum sites were noted to be hemostatic.?  ?  Counts were reported to be correct times two. Dr. Salazar was present for the entire procedure. The patient was awakened from anesthesia and taken to the recovery room in a stable condition.  ?  Katie Lopez MD  [facility], [facility]  ?  ?   This certifies I was present during the entire period between opening and closing of the surgical field.

## 2022-11-02 ENCOUNTER — HOSPITAL ENCOUNTER (EMERGENCY)
Facility: HOSPITAL | Age: 72
Discharge: HOME | End: 2022-11-02
Attending: FAMILY MEDICINE
Payer: COMMERCIAL

## 2022-11-02 VITALS
DIASTOLIC BLOOD PRESSURE: 101 MMHG | HEART RATE: 86 BPM | TEMPERATURE: 98 F | HEIGHT: 62 IN | OXYGEN SATURATION: 100 % | WEIGHT: 209.44 LBS | BODY MASS INDEX: 38.54 KG/M2 | SYSTOLIC BLOOD PRESSURE: 162 MMHG | RESPIRATION RATE: 16 BRPM

## 2022-11-02 DIAGNOSIS — M25.562 BILATERAL CHRONIC KNEE PAIN: Primary | ICD-10-CM

## 2022-11-02 DIAGNOSIS — M25.561 BILATERAL CHRONIC KNEE PAIN: Primary | ICD-10-CM

## 2022-11-02 DIAGNOSIS — G89.29 BILATERAL CHRONIC KNEE PAIN: Primary | ICD-10-CM

## 2022-11-02 DIAGNOSIS — M19.90 ARTHRITIS: ICD-10-CM

## 2022-11-02 PROCEDURE — 99284 EMERGENCY DEPT VISIT MOD MDM: CPT | Mod: 25

## 2022-11-02 RX ORDER — DICLOFENAC SODIUM 10 MG/G
4 GEL TOPICAL DAILY
Qty: 450 G | Refills: 0 | OUTPATIENT
Start: 2022-11-02 | End: 2023-09-27

## 2022-11-02 RX ORDER — METHYLPREDNISOLONE 4 MG/1
TABLET ORAL
Qty: 1 EACH | Refills: 0 | Status: SHIPPED | OUTPATIENT
Start: 2022-11-02 | End: 2022-12-08 | Stop reason: ALTCHOICE

## 2022-11-02 NOTE — ED PROVIDER NOTES
Encounter Date: 11/2/2022       History     Chief Complaint   Patient presents with    Knee Pain     PT W CO CHRONIC KNEE PAIN > 6 MONTHS.  NO RECENT INJURY.        71 YO AAF in ER with complaints of non traumatic bilateral knee pain for over a year. Needs a handicap form filled out for the [employer]. Her PCP was Dr. Sousa but since she left pt has not gotten a new PCP. Denies fever, chills, chest pain, SOB, abdominal pain, N/V/D, HA or dizziness. No other complaints.     The history is provided by the patient.   Review of patient's allergies indicates:  No Known Allergies  Past Medical History:   Diagnosis Date    Hypertension      No past surgical history on file.  No family history on file.  Social History     Tobacco Use    Smoking status: Never    Smokeless tobacco: Never     Review of Systems   Constitutional:  Negative for chills and fever.   HENT:  Negative for congestion and sore throat.    Respiratory:  Negative for shortness of breath.    Cardiovascular:  Negative for chest pain.   Gastrointestinal:  Negative for abdominal pain, diarrhea, nausea and vomiting.   Genitourinary:  Negative for dysuria.   Musculoskeletal:  Positive for arthralgias and joint swelling. Negative for back pain.   Skin:  Negative for rash.   Neurological:  Negative for dizziness, weakness, light-headedness and headaches.   Hematological:  Does not bruise/bleed easily.   All other systems reviewed and are negative.    Physical Exam     Initial Vitals [11/02/22 1341]   BP Pulse Resp Temp SpO2   (!) 162/101 86 16 97.9 °F (36.6 °C) 100 %      MAP       --         Physical Exam    Nursing note and vitals reviewed.  Constitutional: She appears well-developed and well-nourished. She is not diaphoretic. No distress.   HENT:   Head: Normocephalic and atraumatic.   Mouth/Throat: Oropharynx is clear and moist.   Eyes: Conjunctivae are normal.   Cardiovascular:  Normal rate, regular rhythm, normal heart sounds and intact distal pulses.            Pulmonary/Chest: Breath sounds normal.   Musculoskeletal:         General: Tenderness present.      Right knee: Crepitus present. No swelling, deformity, erythema or ecchymosis. Normal range of motion. Tenderness present over the medial joint line and lateral joint line.      Left knee: Crepitus present. No swelling, deformity, erythema or ecchymosis. Normal range of motion. Tenderness present over the medial joint line and lateral joint line.     Neurological: She is alert and oriented to person, place, and time. She has normal strength.   Skin: Skin is warm and dry.   Psychiatric: She has a normal mood and affect.       ED Course   Procedures  Labs Reviewed - No data to display       Imaging Results    None          Medications - No data to display                           Clinical Impression:   Final diagnoses:  [M25.561, M25.562, G89.29] Bilateral chronic knee pain (Primary)  [M19.90] Arthritis      ED Disposition Condition    Discharge Stable          ED Prescriptions       Medication Sig Dispense Start Date End Date Auth. Provider    methylPREDNISolone (MEDROL DOSEPACK) 4 mg tablet Take as package instructs 1 each 11/2/2022 -- ISAAC Bowie    diclofenac sodium (VOLTAREN) 1 % Gel Apply 4 g topically once daily. To both knees 450 g 11/2/2022 -- ISAAC Bowie          Follow-up Information       Follow up With Specialties Details Why Contact Info    [facility] - Emergency Dept Emergency Medicine In 3 days As needed, If symptoms worsen [address] [facility] [address] [phone]    [facility]   Internal Medicine Clinic, they will call you with an appointment [address] [facility] [address]             ISAAC Bowie  11/02/22 7590

## 2022-11-18 ENCOUNTER — HOSPITAL ENCOUNTER (EMERGENCY)
Facility: HOSPITAL | Age: 72
Discharge: HOME | End: 2022-11-18
Attending: FAMILY MEDICINE
Payer: COMMERCIAL

## 2022-11-18 VITALS
BODY MASS INDEX: 38.47 KG/M2 | WEIGHT: 210.31 LBS | HEART RATE: 68 BPM | RESPIRATION RATE: 20 BRPM | DIASTOLIC BLOOD PRESSURE: 112 MMHG | OXYGEN SATURATION: 99 % | SYSTOLIC BLOOD PRESSURE: 159 MMHG | TEMPERATURE: 96 F

## 2022-11-18 DIAGNOSIS — Z76.0 ENCOUNTER FOR MEDICATION REFILL: ICD-10-CM

## 2022-11-18 DIAGNOSIS — M25.562 BILATERAL CHRONIC KNEE PAIN: Primary | ICD-10-CM

## 2022-11-18 DIAGNOSIS — G89.29 BILATERAL CHRONIC KNEE PAIN: Primary | ICD-10-CM

## 2022-11-18 DIAGNOSIS — M25.561 BILATERAL CHRONIC KNEE PAIN: Primary | ICD-10-CM

## 2022-11-18 PROBLEM — M10.9 GOUT: Status: ACTIVE | Noted: 2022-11-18

## 2022-11-18 PROBLEM — I10 HYPERTENSION: Status: ACTIVE | Noted: 2022-11-18

## 2022-11-18 PROCEDURE — 96372 THER/PROPH/DIAG INJ SC/IM: CPT | Performed by: PHYSICIAN ASSISTANT

## 2022-11-18 PROCEDURE — 63600175 PHARM REV CODE 636 W HCPCS: Performed by: PHYSICIAN ASSISTANT

## 2022-11-18 PROCEDURE — 99284 EMERGENCY DEPT VISIT MOD MDM: CPT | Mod: 25

## 2022-11-18 RX ORDER — NIFEDIPINE 60 MG/1
60 TABLET, EXTENDED RELEASE ORAL DAILY
COMMUNITY
Start: 2021-06-30 | End: 2022-11-18 | Stop reason: SDUPTHER

## 2022-11-18 RX ORDER — DEXAMETHASONE SODIUM PHOSPHATE 4 MG/ML
8 INJECTION, SOLUTION INTRA-ARTICULAR; INTRALESIONAL; INTRAMUSCULAR; INTRAVENOUS; SOFT TISSUE
Status: COMPLETED | OUTPATIENT
Start: 2022-11-18 | End: 2022-11-18

## 2022-11-18 RX ORDER — MELOXICAM 7.5 MG/1
7.5 TABLET ORAL DAILY
Qty: 14 TABLET | Refills: 0 | Status: SHIPPED | OUTPATIENT
Start: 2022-11-18 | End: 2022-12-08 | Stop reason: ALTCHOICE

## 2022-11-18 RX ORDER — NIFEDIPINE 60 MG/1
60 TABLET, EXTENDED RELEASE ORAL DAILY
Qty: 30 TABLET | Refills: 1 | Status: SHIPPED | OUTPATIENT
Start: 2022-11-18 | End: 2022-12-08 | Stop reason: SDUPTHER

## 2022-11-18 RX ORDER — METOPROLOL TARTRATE 25 MG/1
25 TABLET, FILM COATED ORAL 2 TIMES DAILY
COMMUNITY
End: 2022-11-18 | Stop reason: SDUPTHER

## 2022-11-18 RX ORDER — METOPROLOL TARTRATE 25 MG/1
25 TABLET, FILM COATED ORAL 2 TIMES DAILY
Qty: 60 TABLET | Refills: 1 | Status: SHIPPED | OUTPATIENT
Start: 2022-11-18 | End: 2022-12-08 | Stop reason: SDUPTHER

## 2022-11-18 RX ADMIN — DEXAMETHASONE SODIUM PHOSPHATE 8 MG: 4 INJECTION, SOLUTION INTRA-ARTICULAR; INTRALESIONAL; INTRAMUSCULAR; INTRAVENOUS; SOFT TISSUE at 09:11

## 2022-11-18 NOTE — NURSING
Patient presented to ER for medication refill, she has an appointment today in Medicine clinic with REYES Schmidt NP when she arrived she was told provider was out of clinic today.   stated a message was left on her VM, appointment has been re-scheduled for 12/8/2022.  Instructed to present to ER for medication refill.

## 2022-11-18 NOTE — ED PROVIDER NOTES
Encounter Date: 11/18/2022       History     Chief Complaint   Patient presents with    Knee Pain     C/o bilateral knee pain worsening over time.     Medication Refill     States needs med refill for bp meds. Bp 159/112. Asymptomatic.      Shelby Larios is a 72 y.o. female with a history of HTN, arthritis, chronic bilateral knee pain who presents to the ED complaining of bilateral knee pain and medication refill. She had an internal medicine appointment this am that was rescheduled for December. She reports being told to come to the ED. Knee pain is consistent with her chronic pain that occasionally worsens. She used to get joint injections that helped significantly, but has not gotten them in a while. She also has empty bottles of nifedipine and metoprolol with her that she requests refill for. She has been out of meds for 4 days. She denies headache, chest pain, SOB, gait abnormality, fall or new trauma/injury to knees.     The history is provided by the patient. No  was used.   Review of patient's allergies indicates:  No Known Allergies  Past Medical History:   Diagnosis Date    Hypertension      History reviewed. No pertinent surgical history.  History reviewed. No pertinent family history.  Social History     Tobacco Use    Smoking status: Never    Smokeless tobacco: Never   Substance Use Topics    Alcohol use: Never    Drug use: Never     Review of Systems   Constitutional:  Negative for chills and fever.   HENT:  Negative for congestion and sore throat.    Eyes:  Negative for redness and itching.   Respiratory:  Negative for cough and shortness of breath.    Cardiovascular:  Negative for chest pain and leg swelling.   Gastrointestinal:  Negative for abdominal pain and nausea.   Genitourinary:  Negative for dysuria and enuresis.   Musculoskeletal:  Positive for arthralgias. Negative for back pain and joint swelling.   Skin:  Negative for color change and rash.   Neurological:   Negative for dizziness and headaches.     Physical Exam     Initial Vitals [11/18/22 0825]   BP Pulse Resp Temp SpO2   (!) 159/112 68 20 96.4 °F (35.8 °C) 99 %      MAP       --         Physical Exam    Nursing note and vitals reviewed.  Constitutional: She appears well-developed and well-nourished. No distress.   HENT:   Head: Normocephalic and atraumatic.   Mouth/Throat: No oropharyngeal exudate.   Eyes: EOM are normal. No scleral icterus.   Neck: Neck supple.   Normal range of motion.  Cardiovascular:  Normal rate and regular rhythm.           No murmur heard.  Pulmonary/Chest: No respiratory distress. She has no wheezes.   Abdominal: Abdomen is soft. Bowel sounds are normal. She exhibits no distension. There is no abdominal tenderness.   Musculoskeletal:         General: No tenderness or edema. Normal range of motion.      Cervical back: Normal range of motion and neck supple.      Comments: Ambulating independently. Full ROM of bilateral knees with mild pain. No bony TTP. No warmth, erythema, or edema.      Neurological: She is alert and oriented to person, place, and time. No cranial nerve deficit.   Skin: Skin is warm and dry. Capillary refill takes less than 2 seconds. No erythema.   Psychiatric: She has a normal mood and affect. Thought content normal.       ED Course   Procedures  Labs Reviewed - No data to display       Imaging Results    None          Medications   dexAMETHasone injection 8 mg (8 mg Intramuscular Given 11/18/22 0942)                              Clinical Impression:   Final diagnoses:  [M25.561, M25.562, G89.29] Bilateral chronic knee pain (Primary)  [Z76.0] Encounter for medication refill        ED Disposition Condition    Discharge Stable          ED Prescriptions       Medication Sig Dispense Start Date End Date Auth. Provider    NIFEdipine (PROCARDIA XL) 60 MG (OSM) 24 hr tablet Take 1 tablet (60 mg total) by mouth once daily. 30 tablet 11/18/2022 11/18/2023 Emily Soliman PA-C     metoprolol tartrate (LOPRESSOR) 25 MG tablet Take 1 tablet (25 mg total) by mouth 2 (two) times daily. 60 tablet 11/18/2022 1/17/2023 Emily Soliman PA-C    meloxicam (MOBIC) 7.5 MG tablet Take 1 tablet (7.5 mg total) by mouth once daily. for 14 days 14 tablet 11/18/2022 12/2/2022 Emily Soliman PA-C          Follow-up Information       Follow up With Specialties Details Why Contact Info    [facility] Dept Emergency Medicine  If symptoms worsen [address] [facility] [id number] [phone]    Elicia Schmidt NP  On 12/8/2022 As scheduled              Emily Soliman PA-C  11/18/22 2908

## 2022-12-08 ENCOUNTER — OFFICE VISIT (OUTPATIENT)
Dept: INTERNAL MEDICINE | Facility: CLINIC | Age: 72
End: 2022-12-08
Payer: COMMERCIAL

## 2022-12-08 VITALS
HEIGHT: 62 IN | SYSTOLIC BLOOD PRESSURE: 150 MMHG | HEART RATE: 66 BPM | DIASTOLIC BLOOD PRESSURE: 82 MMHG | TEMPERATURE: 98 F | WEIGHT: 210.63 LBS | BODY MASS INDEX: 38.76 KG/M2

## 2022-12-08 DIAGNOSIS — R41.3 MEMORY LOSS: ICD-10-CM

## 2022-12-08 DIAGNOSIS — Z12.31 ENCOUNTER FOR SCREENING MAMMOGRAM FOR MALIGNANT NEOPLASM OF BREAST: ICD-10-CM

## 2022-12-08 DIAGNOSIS — G89.29 BILATERAL CHRONIC KNEE PAIN: ICD-10-CM

## 2022-12-08 DIAGNOSIS — M25.561 BILATERAL CHRONIC KNEE PAIN: ICD-10-CM

## 2022-12-08 DIAGNOSIS — Z78.0 POST-MENOPAUSAL: ICD-10-CM

## 2022-12-08 DIAGNOSIS — E66.01 CLASS 2 SEVERE OBESITY DUE TO EXCESS CALORIES WITH SERIOUS COMORBIDITY AND BODY MASS INDEX (BMI) OF 38.0 TO 38.9 IN ADULT: ICD-10-CM

## 2022-12-08 DIAGNOSIS — Z12.11 SCREENING FOR MALIGNANT NEOPLASM OF COLON: ICD-10-CM

## 2022-12-08 DIAGNOSIS — Z23 NEED FOR VACCINATION: ICD-10-CM

## 2022-12-08 DIAGNOSIS — M17.0 BILATERAL PRIMARY OSTEOARTHRITIS OF KNEE: ICD-10-CM

## 2022-12-08 DIAGNOSIS — M25.562 BILATERAL CHRONIC KNEE PAIN: ICD-10-CM

## 2022-12-08 DIAGNOSIS — N18.31 STAGE 3A CHRONIC KIDNEY DISEASE: ICD-10-CM

## 2022-12-08 DIAGNOSIS — G47.33 OSA (OBSTRUCTIVE SLEEP APNEA): ICD-10-CM

## 2022-12-08 DIAGNOSIS — M10.9 GOUT, UNSPECIFIED CAUSE, UNSPECIFIED CHRONICITY, UNSPECIFIED SITE: ICD-10-CM

## 2022-12-08 DIAGNOSIS — I10 PRIMARY HYPERTENSION: Primary | ICD-10-CM

## 2022-12-08 DIAGNOSIS — Z00.00 WELLNESS EXAMINATION: ICD-10-CM

## 2022-12-08 DIAGNOSIS — M19.90 ARTHRITIS: ICD-10-CM

## 2022-12-08 DIAGNOSIS — R68.89 FORGETFULNESS: ICD-10-CM
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
## 2022-12-08 PROBLEM — E66.812 CLASS 2 SEVERE OBESITY DUE TO EXCESS CALORIES WITH SERIOUS COMORBIDITY AND BODY MASS INDEX (BMI) OF 38.0 TO 38.9 IN ADULT: Status: ACTIVE | Noted: 2022-12-08

## 2022-12-08 PROBLEM — R29.6 FALLS: Status: ACTIVE | Noted: 2022-12-08
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
## 2022-12-08 PROCEDURE — 3008F BODY MASS INDEX DOCD: CPT | Mod: CPTII,,, | Performed by: NURSE PRACTITIONER

## 2022-12-08 PROCEDURE — 1159F MED LIST DOCD IN RCRD: CPT | Mod: CPTII,,, | Performed by: NURSE PRACTITIONER

## 2022-12-08 PROCEDURE — G0008 ADMIN INFLUENZA VIRUS VAC: HCPCS | Mod: PBBFAC

## 2022-12-08 PROCEDURE — 3288F FALL RISK ASSESSMENT DOCD: CPT | Mod: CPTII,,, | Performed by: NURSE PRACTITIONER

## 2022-12-08 PROCEDURE — 1125F AMNT PAIN NOTED PAIN PRSNT: CPT | Mod: CPTII,,, | Performed by: NURSE PRACTITIONER

## 2022-12-08 PROCEDURE — 1101F PR PT FALLS ASSESS DOC 0-1 FALLS W/OUT INJ PAST YR: ICD-10-PCS | Mod: CPTII,,, | Performed by: NURSE PRACTITIONER

## 2022-12-08 PROCEDURE — 1101F PT FALLS ASSESS-DOCD LE1/YR: CPT | Mod: CPTII,,, | Performed by: NURSE PRACTITIONER

## 2022-12-08 PROCEDURE — 1159F PR MEDICATION LIST DOCUMENTED IN MEDICAL RECORD: ICD-10-PCS | Mod: CPTII,,, | Performed by: NURSE PRACTITIONER

## 2022-12-08 PROCEDURE — 3077F PR MOST RECENT SYSTOLIC BLOOD PRESSURE >= 140 MM HG: ICD-10-PCS | Mod: CPTII,,, | Performed by: NURSE PRACTITIONER

## 2022-12-08 PROCEDURE — 3008F PR BODY MASS INDEX (BMI) DOCUMENTED: ICD-10-PCS | Mod: CPTII,,, | Performed by: NURSE PRACTITIONER

## 2022-12-08 PROCEDURE — 99215 OFFICE O/P EST HI 40 MIN: CPT | Mod: S$PBB,,, | Performed by: NURSE PRACTITIONER

## 2022-12-08 PROCEDURE — 99215 PR OFFICE/OUTPT VISIT, EST, LEVL V, 40-54 MIN: ICD-10-PCS | Mod: S$PBB,,, | Performed by: NURSE PRACTITIONER

## 2022-12-08 PROCEDURE — 1125F PR PAIN SEVERITY QUANTIFIED, PAIN PRESENT: ICD-10-PCS | Mod: CPTII,,, | Performed by: NURSE PRACTITIONER

## 2022-12-08 PROCEDURE — 90677 PCV20 VACCINE IM: CPT | Mod: PBBFAC

## 2022-12-08 PROCEDURE — 99215 OFFICE O/P EST HI 40 MIN: CPT | Mod: PBBFAC | Performed by: NURSE PRACTITIONER

## 2022-12-08 PROCEDURE — 3079F DIAST BP 80-89 MM HG: CPT | Mod: CPTII,,, | Performed by: NURSE PRACTITIONER

## 2022-12-08 PROCEDURE — 3077F SYST BP >= 140 MM HG: CPT | Mod: CPTII,,, | Performed by: NURSE PRACTITIONER

## 2022-12-08 PROCEDURE — 3288F PR FALLS RISK ASSESSMENT DOCUMENTED: ICD-10-PCS | Mod: CPTII,,, | Performed by: NURSE PRACTITIONER

## 2022-12-08 PROCEDURE — 3079F PR MOST RECENT DIASTOLIC BLOOD PRESSURE 80-89 MM HG: ICD-10-PCS | Mod: CPTII,,, | Performed by: NURSE PRACTITIONER

## 2022-12-08 RX ORDER — NIFEDIPINE 60 MG/1
60 TABLET, EXTENDED RELEASE ORAL DAILY
Qty: 90 TABLET | Refills: 1 | Status: SHIPPED | OUTPATIENT
Start: 2022-12-08 | End: 2023-07-03

## 2022-12-08 RX ORDER — METOPROLOL TARTRATE 25 MG/1
25 TABLET, FILM COATED ORAL 2 TIMES DAILY
Qty: 180 TABLET | Refills: 1 | Status: SHIPPED | OUTPATIENT
Start: 2022-12-08 | End: 2023-03-29 | Stop reason: ALTCHOICE

## 2022-12-08 RX ORDER — LIFITEGRAST 50 MG/ML
1 SOLUTION/ DROPS OPHTHALMIC 2 TIMES DAILY
COMMUNITY
Start: 2022-10-06

## 2022-12-08 NOTE — ASSESSMENT & PLAN NOTE
Fall precautions discussed.  Wear shoes with rubber soles that do not have an opening at the toe.  Keep inside and outside of house well lit.  Use night lights throughout home.  Remove clutter from floors.  Clean up spills immediately.  Do not climb on stools or step ladders, if possible.  Put grab bars by tub, shower and toilet.  Put handrails on both sides of stairs/stairway.

## 2022-12-08 NOTE — ASSESSMENT & PLAN NOTE
Last GFR had improved to 72 from 57.  CMP ordered.  Renoprotective measures discussed:  -Follow renal diet (reduce intake of nuts, peanut butter, milk, cheese, dried beans, peas) and Low Sodium Diet (less than 2 grams per day).  -Control high blood pressure ( goal BP < 130/80, please record BP at home every day and bring log to next office visit)  -Exercise at least 30 minutes a day, 5 days a week.  -Maintain healthy weight.  -Decrease or stop alcohol use.  -Do not smoke.  -Stay well hydrated.  -Receive Pneumovax, Flu, and HBV vaccines if indicated.  -Do not take NSAIDs (Ibuprofen, Naproxen, Aleve, Advil, Toradol, Mobic), may take only Tylenol as needed for pain/headaches.  -Take cholesterol-lowering medications as prescribed (LDL goal <100).

## 2022-12-08 NOTE — ASSESSMENT & PLAN NOTE
Elevated; did not take meds this am.  Refilled procardia and lopressor.  Follow a low sodium (less than 2 grams of sodium per day), DASH diet.   Continue medications as prescribed.  Monitor blood pressure and report any consistent values greater than 140/90 and keep a log.  Maintain healthy weight with a BMI goal of <30.   Aerobic exercise for 150 minutes per week (or 5 days a week for 30 minutes each day).

## 2022-12-08 NOTE — PROGRESS NOTES
"  KAREL Harris   [facility]  [address] [facility] [address]      PATIENT NAME: Shelby Larios  : 1950  DATE: 22  MRN: [id number]      Billing Provider: KAREL Harris  Level of Service: PA OFFICE/OUTPT VISIT, EST, LEVL V, 40-54 MIN  Patient PCP Information       Provider PCP Type    KAREL Harris General            Reason for Visit / Chief Complaint: Establish Care (Wants flu, Pn vaccines, refused shingrix, not fasting)       History of Present Illness / Problem Focused Workflow     Shelby Larios is a 72 y.o. Black or  female presents to the clinic to establish PCP in [facility]; formerly seeing Dr. Jessica Sousa for primary care. PMH HTN, gout, VIC (machine returned d/t noncompliance), CKD st 3, bilateral knee OA (R>L), and morbid obesity. Followed by [facility] cardio clinic. Has not seen Dr. Sousa since  and cardio since . , Александр, present during visit and reports pt is increasingly forgetful over the past year. Has stopped pt from driving as she would not remember where she was going. Constantly forgets what she is doing or what she was going to do/get multiple times daily. Does not recall grandchildrens names, sisters name, and other immediate family members names. She calls them "you." Reports mother, father and sister had dementia. Also reports pt has fallen within the last couple of months d/t worsening knee pain. Reported to ED x 2 in Nov. for bilateral knee pain; received minimal relief with oral steroids and mobic. Also received bp med refills during that visit. Reports med compliance.  states pt did not take her meds this am and is not fasting. Pt has received CSI to knees in the past and now desires TKA. Was referred to Dr. Mcmahon in April; states never contacted for visit. Does not follow low sodium diet or exercise routinely. Amendable to flu and pneumo vaccines " today. Will receive shingles at next visit. Denies chest pain, shortness of breath, cough, fever, headache, dizziness, weakness, abdominal pain, nausea, vomiting, diarrhea, constipation, dysuria, depression, anxiety, SI/HI.    I spent a total of 56 minutes on the day of the visit.This includes face to face time and non-face to face time preparing to see the patient (eg, review of tests), obtaining and/or reviewing separately obtained history, documenting clinical information in the electronic or other health record, independently interpreting results and communicating results to the patient/family/caregiver, or care coordinator.      Review of Systems     Review of Systems   Constitutional: Negative.    HENT: Negative.     Eyes: Negative.    Respiratory: Negative.     Cardiovascular: Negative.    Gastrointestinal: Negative.    Endocrine: Negative.    Genitourinary: Negative.    Musculoskeletal:  Positive for arthralgias.        Positive for bilateral knee pain, R>L   Integumentary:  Negative.   Neurological: Negative.    Psychiatric/Behavioral: Negative.          Positive for forgetfulness      Medical / Social / Family History     Past Medical History:   Diagnosis Date    Hypertension        Past Surgical History:   Procedure Laterality Date    TUBAL LIGATION         Social History  Ms. Larios reports that she has never smoked. She has never used smokeless tobacco. She reports current alcohol use of about 10.0 standard drinks per week. She reports that she does not use drugs.    Family History  's family history is not on file.    Medications and Allergies     Medications  Medication List with Changes/Refills   Current Medications    DICLOFENAC SODIUM (VOLTAREN) 1 % GEL    Apply 4 g topically once daily. To both knees    XIIDRA 5 % DPET    Place 1 drop into both eyes 2 (two) times daily.   Changed and/or Refilled Medications    Modified Medication Previous Medication    METOPROLOL TARTRATE (LOPRESSOR) 25 MG  "TABLET metoprolol tartrate (LOPRESSOR) 25 MG tablet       Take 1 tablet (25 mg total) by mouth 2 (two) times daily.    Take 1 tablet (25 mg total) by mouth 2 (two) times daily.    NIFEDIPINE (PROCARDIA XL) 60 MG (OSM) 24 HR TABLET NIFEdipine (PROCARDIA XL) 60 MG (OSM) 24 hr tablet       Take 1 tablet (60 mg total) by mouth once daily.    Take 1 tablet (60 mg total) by mouth once daily.   Discontinued Medications    MELOXICAM (MOBIC) 7.5 MG TABLET    Take 1 tablet (7.5 mg total) by mouth once daily. for 14 days    METHYLPREDNISOLONE (MEDROL DOSEPACK) 4 MG TABLET    Take as package instructs         Allergies  Review of patient's allergies indicates:  No Known Allergies    Physical Examination   Vital Signs  Temp: 98.1 °F (36.7 °C)  Temp src: Oral  Pulse: 66  BP: (!) 150/82  BP Location: Left arm  Patient Position: Sitting  Pain Score:   4  Pain Loc: Knee (Right)  Height and Weight  Height: 5' 2.01" (157.5 cm)  Weight: 95.5 kg (210 lb 9.6 oz)  BSA (Calculated - sq m): 2.04 sq meters  BMI (Calculated): 38.5  Weight in (lb) to have BMI = 25: 136.4]   Physical Exam  Vitals reviewed.   Constitutional:       Appearance: Normal appearance. She is obese.   HENT:      Head: Normocephalic.   Eyes:      Pupils: Pupils are equal, round, and reactive to light.   Cardiovascular:      Rate and Rhythm: Normal rate and regular rhythm.      Heart sounds: Normal heart sounds.   Pulmonary:      Effort: Pulmonary effort is normal.      Breath sounds: Normal breath sounds.   Abdominal:      General: Bowel sounds are normal.      Palpations: Abdomen is soft.   Musculoskeletal:      Right knee: Swelling, deformity, bony tenderness and crepitus present. Decreased range of motion.      Left knee: Swelling, deformity, bony tenderness and crepitus present. Decreased range of motion.   Skin:     General: Skin is warm.   Neurological:      Mental Status: She is alert and oriented to person, place, and time.   Psychiatric:         Mood and Affect: " Mood normal.         Speech: Speech normal.         Behavior: Behavior is cooperative.         Cognition and Memory: Memory is impaired.         Judgment: Judgment normal.         Results     Lab Results   Component Value Date    WBC 7.2 09/03/2020    RBC 4.11 09/03/2020    HGB 12.1 09/03/2020    HCT 39.0 09/03/2020    MCV 94.9 09/03/2020    MCH 29.4 09/03/2020    MCHC 31.0 09/03/2020    RDW 15.5 (H) 09/03/2020     09/03/2020    MPV 10.1 09/03/2020     CMP  Sodium Level   Date Value Ref Range Status   07/06/2021 143 136 - 145 mmol/L Final     Potassium Level   Date Value Ref Range Status   07/06/2021 3.6 3.5 - 5.1 mmol/L Final     Carbon Dioxide   Date Value Ref Range Status   07/06/2021 23 23 - 31 mmol/L Final     Blood Urea Nitrogen   Date Value Ref Range Status   07/06/2021 15.8 9.8 - 20.1 mg/dL Final     Creatinine   Date Value Ref Range Status   07/06/2021 0.98 0.55 - 1.02 mg/dL Final     Calcium Level Total   Date Value Ref Range Status   07/06/2021 10.0 8.4 - 10.2 mg/dL Final     Albumin Level   Date Value Ref Range Status   07/06/2021 3.4 3.4 - 4.8 gm/dL Final     Bilirubin Total   Date Value Ref Range Status   07/06/2021 0.6 <<=1.5 mg/dL Final     Alkaline Phosphatase   Date Value Ref Range Status   07/06/2021 74 40 - 150 unit/L Final     Aspartate Aminotransferase   Date Value Ref Range Status   07/06/2021 23 5 - 34 unit/L Final     Alanine Aminotransferase   Date Value Ref Range Status   07/06/2021 14 0 - 55 unit/L Final     Estimated GFR-Non    Date Value Ref Range Status   07/06/2021 60 (L) >>=90 mL/min/1.73 m2 Final     Lab Results   Component Value Date    CHOL 219 (H) 07/06/2021     Lab Results   Component Value Date    HDL 63 (H) 07/06/2021     No results found for: LDLCALC  Lab Results   Component Value Date    TRIG 119 07/06/2021     No results found for: CHOLHDL  No results found for: TSH  Lab Results   Component Value Date    PHUR 6.0 09/19/2017    PROTEINUA 70 (A)  09/19/2017    GLUCUA Normal 09/19/2017    KETONESU Trace (A) 09/19/2017    OCCULTUA 0.2 09/19/2017    NITRITE Negative 09/19/2017    LEUKOCYTESUR Negative 09/19/2017     Reason For Exam  Pain    Radiology Report  XR Knee Right 3 Views     REASON FOR EXAM: Pain     COMPARISON: Radiographs 9/3/2020     FINDINGS: Tricompartment degenerative changes with severe medial  compartment joint space narrowing. Chondrocalcinosis noted in the  lateral compartment. No acute fracture or dislocation identified.     IMPRESSION: No acute osseous process identified.          Signature Line  Electronically Signed By: Raymundo Lewis MD  Date/Time Signed: 03/24/2022 11:54    Reason For Exam  Pain    Radiology Report  Comparison: Radiographs contralateral knee used for comparison dated  5/6/2000.     INDICATION: Bilateral knee pain. History of multiple falls.     TECHNIQUE: 4 views of both knees were obtained.     FINDINGS:     Right knee: Advanced tricompartmental osteoarthritic change, worst in  the medial compartment. Chondrocalcinosis is present. Bipartite  patella or fractured lateral patellar osteophyte. Subchondral lucency  noted to the lateral tibial plateau is likely artifactual. Stress  fracture not excluded.     Left knee: Advanced tricompartmental osteoarthritic change, worst in  the medial compartment. Chondrocalcinosis. Bipartite patella or old  fractured patellar osteophyte. No knee joint effusion.     IMPRESSION:     Severe tricompartmental osteoarthritic changes are present  bilaterally, worst in the medial compartments.     Chondrocalcinosis. Please correlate clinically for CPPD or similar  process.     Bipartite patella or old fractured lateral patellar osteophytes       Signature Line  Electronically Signed By: Indu EVANS, Brooks Guthrie  Date/Time Signed: 05/06/2019 08:53        Assessment and Plan (including Health Maintenance)     Plan: RTC 3 weeks and prn. Labs must be completed prior to visit.          Health  Maintenance Due   Topic Date Due    DEXA Scan  Never done    Colorectal Cancer Screening  Never done    Shingles Vaccine (1 of 2) Never done    Mammogram  09/24/2020    COVID-19 Vaccine (4 - Booster for Moderna series) 06/09/2022       Problem List Items Addressed This Visit          Neuro    Memory loss    Current Assessment & Plan     Referral to  Geriatric Clinic to eval/treat.  Keep appt when scheduled.  Do not allow pt to drive.  Continue to monitor ADLs - like cooking, bathing.  Memory exercises encouraged.             Cardiac/Vascular    Hypertension - Primary    Current Assessment & Plan     Elevated; did not take meds this am.  Refilled procardia and lopressor.  Follow a low sodium (less than 2 grams of sodium per day), DASH diet.   Continue medications as prescribed.  Monitor blood pressure and report any consistent values greater than 140/90 and keep a log.  Maintain healthy weight with a BMI goal of <30.   Aerobic exercise for 150 minutes per week (or 5 days a week for 30 minutes each day).           Relevant Orders    CBC Auto Differential    Comprehensive Metabolic Panel    Lipid Panel    T4, Free    TSH    Urinalysis, Reflex to Urine Culture Urine, Clean Catch    Microalbumin/Creatinine Ratio, Urine       Renal/    Stage 3a chronic kidney disease    Current Assessment & Plan     Last GFR had improved to 72 from 57.  CMP ordered.  Renoprotective measures discussed:  -Follow renal diet (reduce intake of nuts, peanut butter, milk, cheese, dried beans, peas) and Low Sodium Diet (less than 2 grams per day).  -Control high blood pressure ( goal BP < 130/80, please record BP at home every day and bring log to next office visit)  -Exercise at least 30 minutes a day, 5 days a week.  -Maintain healthy weight.  -Decrease or stop alcohol use.  -Do not smoke.  -Stay well hydrated.  -Receive Pneumovax, Flu, and HBV vaccines if indicated.  -Do not take NSAIDs (Ibuprofen, Naproxen, Aleve, Advil, Toradol, Mobic),  may take only Tylenol as needed for pain/headaches.  -Take cholesterol-lowering medications as prescribed (LDL goal <100).           Encounter for screening mammogram for malignant neoplasm of breast    Current Assessment & Plan     MMG ordered.         Relevant Orders    Mammo Digital Screening Bilat    Post-menopausal    Current Assessment & Plan     Dexa scan ordered.         Relevant Orders    DXA Bone Density Appendicular Skeleton       Endocrine    Class 2 severe obesity due to excess calories with serious comorbidity and body mass index (BMI) of 38.0 to 38.9 in adult    Current Assessment & Plan     BMI 38. Goal BMI <30.  Aerobic exercise 150 minutes per week.  Avoid soda, simple sugars, sweets, excessive rice, pasta, potatoes or bread.   Choose brown options when available and portion control.  Limit fast foods and fried foods.   Choose complex carbs in moderation (ex: green, leafy vegetables, beans, oatmeal).  Eat plenty of fresh fruits and vegetables with lean meats daily.   Consider permanent healthy lifestyle changes.           Relevant Orders    Hemoglobin A1C       GI    Screening for malignant neoplasm of colon    Current Assessment & Plan     Cologuard ordered.         Relevant Orders    Cologuard Screening (Multitarget Stool DNA)       Orthopedic    Gout    Current Assessment & Plan     Has been out of allopurinol x several months.  Uric acid level ordered.  Will resume once results reviewed.   Stay well hydrated by increasing water intake throughout the day.  Stressed importance of exercise and weight loss to maintain BMI <30.  Instructed on low purine diet; avoid alcohol, sodas, organ/glandular meats (liver, kidney, sweetbreads). Limit seafood and red meat intake.  Eat a balanced diet of fruits, vegetables, complex carbohydrates, and lean sources of protein (boneless/skinless chicken breasts, salmon, lentils, low fat dairy).  Discussed possible benefit of OTC Vitamin C supplementation.             Relevant Orders    Uric Acid    Bilateral primary osteoarthritis of knee    Current Assessment & Plan     Referral to Dr. Jeffry Mcmahon to eval/treat.  Keep appt when scheduled.  Perform knee exercises daily.   Avoid activities than increase knee pain or stiffness.   Apply heating pad, ice pack, and BioFreeze/topical capsaicin as needed; alternate every 15-20 minutes.   Continue tylenol arthritis and voltaren gel as needed.           Relevant Orders    Ambulatory referral/consult to Orthopedics    Ambulatory referral/consult to Physical/Occupational Therapy    Falls    Current Assessment & Plan     Fall precautions discussed.  Wear shoes with rubber soles that do not have an opening at the toe.  Keep inside and outside of house well lit.  Use night lights throughout home.  Remove clutter from floors.  Clean up spills immediately.  Do not climb on stools or step ladders, if possible.  Put grab bars by tub, shower and toilet.  Put handrails on both sides of stairs/stairway.              Other    VIC (obstructive sleep apnea)    Current Assessment & Plan     Machine previously returned d/t noncompliance.  Long term SE of uncontrolled VIC discussed.          Other Visit Diagnoses       Bilateral chronic knee pain        Arthritis        Need for vaccination        Relevant Orders    Influenza (FLUAD) - Quadrivalent (Adjuvanted) *Preferred* (65+) (PF) (Completed)    (In Office Administered) Pneumococcal Conjugate Vaccine (20 Valent) (IM) (Completed)    Wellness examination        Relevant Orders    Follow-up primary physician    Forgetfulness        Relevant Orders    Ambulatory referral/consult to Family Practice            Health Maintenance Topics with due status: Not Due       Topic Last Completion Date    TETANUS VACCINE 07/13/2019    Lipid Panel 07/06/2021       Future Appointments   Date Time Provider Department Center   12/29/2022  9:15 AM KAREL [facility] SHAYE Kaminski            Signature:  Elicia  KAREL Nunez  [facility]  [address] [facility] [id number]    Date of encounter: 12/8/22

## 2022-12-08 NOTE — ASSESSMENT & PLAN NOTE
Has been out of allopurinol x several months.  Uric acid level ordered.  Will resume once results reviewed.   Stay well hydrated by increasing water intake throughout the day.  Stressed importance of exercise and weight loss to maintain BMI <30.  Instructed on low purine diet; avoid alcohol, sodas, organ/glandular meats (liver, kidney, sweetbreads). Limit seafood and red meat intake.  Eat a balanced diet of fruits, vegetables, complex carbohydrates, and lean sources of protein (boneless/skinless chicken breasts, salmon, lentils, low fat dairy).  Discussed possible benefit of OTC Vitamin C supplementation.

## 2022-12-08 NOTE — ASSESSMENT & PLAN NOTE
Referral to  Geriatric Clinic to eval/treat.  Keep appt when scheduled.  Do not allow pt to drive.  Continue to monitor ADLs - like cooking, bathing.  Memory exercises encouraged.

## 2022-12-08 NOTE — ASSESSMENT & PLAN NOTE
BMI 38. Goal BMI <30.  Aerobic exercise 150 minutes per week.  Avoid soda, simple sugars, sweets, excessive rice, pasta, potatoes or bread.   Choose brown options when available and portion control.  Limit fast foods and fried foods.   Choose complex carbs in moderation (ex: green, leafy vegetables, beans, oatmeal).  Eat plenty of fresh fruits and vegetables with lean meats daily.   Consider permanent healthy lifestyle changes.

## 2022-12-08 NOTE — ASSESSMENT & PLAN NOTE
Referral to Dr. Jeffry Mcmahon to eval/treat.  Keep appt when scheduled.  Perform knee exercises daily.   Avoid activities than increase knee pain or stiffness.   Apply heating pad, ice pack, and BioFreeze/topical capsaicin as needed; alternate every 15-20 minutes.   Continue tylenol arthritis and voltaren gel as needed.

## 2022-12-09 ENCOUNTER — APPOINTMENT (OUTPATIENT)
Dept: LAB | Facility: HOSPITAL | Age: 72
End: 2022-12-09
Attending: NURSE PRACTITIONER
Payer: COMMERCIAL

## 2022-12-14 ENCOUNTER — DOCUMENTATION ONLY (OUTPATIENT)
Dept: INTERNAL MEDICINE | Facility: CLINIC | Age: 72
End: 2022-12-14
Payer: COMMERCIAL

## 2023-01-16 LAB — NONINV COLON CA DNA+OCC BLD SCRN STL QL: NORMAL

## 2023-01-25 ENCOUNTER — TELEPHONE (OUTPATIENT)
Dept: INTERNAL MEDICINE | Facility: CLINIC | Age: 73
End: 2023-01-25

## 2023-01-25 NOTE — TELEPHONE ENCOUNTER
Unable to contact pt via phone for virtual visit today at 1 pm, message left on voicemail to return call to clinic at [phone].

## 2023-03-23 ENCOUNTER — HOSPITAL ENCOUNTER (OUTPATIENT)
Dept: RADIOLOGY | Facility: HOSPITAL | Age: 73
Discharge: HOME | End: 2023-03-23
Attending: NURSE PRACTITIONER
Payer: COMMERCIAL

## 2023-03-23 DIAGNOSIS — Z12.31 ENCOUNTER FOR SCREENING MAMMOGRAM FOR MALIGNANT NEOPLASM OF BREAST: ICD-10-CM

## 2023-03-23 PROCEDURE — 77067 SCR MAMMO BI INCL CAD: CPT | Mod: 26,,, | Performed by: RADIOLOGY

## 2023-03-23 PROCEDURE — 77067 MAMMO DIGITAL SCREENING BILAT WITH TOMO: ICD-10-PCS | Mod: 26,,, | Performed by: RADIOLOGY

## 2023-03-23 PROCEDURE — 77067 SCR MAMMO BI INCL CAD: CPT | Mod: TC

## 2023-03-23 PROCEDURE — 77063 MAMMO DIGITAL SCREENING BILAT WITH TOMO: ICD-10-PCS | Mod: 26,,, | Performed by: RADIOLOGY

## 2023-03-23 PROCEDURE — 77063 BREAST TOMOSYNTHESIS BI: CPT | Mod: 26,,, | Performed by: RADIOLOGY

## 2023-03-29 ENCOUNTER — OFFICE VISIT (OUTPATIENT)
Dept: FAMILY MEDICINE | Facility: CLINIC | Age: 73
End: 2023-03-29
Payer: COMMERCIAL

## 2023-03-29 VITALS
OXYGEN SATURATION: 100 % | TEMPERATURE: 99 F | DIASTOLIC BLOOD PRESSURE: 80 MMHG | BODY MASS INDEX: 38.94 KG/M2 | SYSTOLIC BLOOD PRESSURE: 135 MMHG | WEIGHT: 211.63 LBS | HEART RATE: 60 BPM | RESPIRATION RATE: 22 BRPM | HEIGHT: 62 IN

## 2023-03-29 DIAGNOSIS — Z13.31 POSITIVE DEPRESSION SCREENING: Primary | ICD-10-CM

## 2023-03-29 DIAGNOSIS — R68.89 FORGETFULNESS: ICD-10-CM

## 2023-03-29 PROCEDURE — 99214 OFFICE O/P EST MOD 30 MIN: CPT | Mod: PBBFAC | Performed by: FAMILY MEDICINE

## 2023-03-29 RX ORDER — LIFITEGRAST 50 MG/ML
1 SOLUTION/ DROPS OPHTHALMIC 2 TIMES DAILY
Status: CANCELLED | OUTPATIENT
Start: 2023-03-29

## 2023-03-29 RX ORDER — METOPROLOL SUCCINATE 25 MG/1
25 TABLET, EXTENDED RELEASE ORAL DAILY
Qty: 30 TABLET | Refills: 11 | Status: CANCELLED | OUTPATIENT
Start: 2023-03-29 | End: 2024-03-28

## 2023-03-29 RX ORDER — NIFEDIPINE 60 MG/1
60 TABLET, EXTENDED RELEASE ORAL DAILY
Qty: 90 TABLET | Refills: 1 | Status: CANCELLED | OUTPATIENT
Start: 2023-03-29

## 2023-03-29 NOTE — PROGRESS NOTES
[facility]  [facility] Geriatric Clinic Note    DOS: 3/29/2023      Subjective:  Chief Complaint:    Chief Complaint   Patient presents with    Memory Loss     New GAC Assessment       History of Present Illness:  Shelby Larios is a 72 y.o. female with a PMH of HTN, CKD, osteoarthritis bilateral knees, with dizziness and frequent falls , most recent    Who presents to geriatric clinic today for:  New GAC initial assessment for cognitive impairment.     Patient lives with  who helps with many IADLs, however  with health concerns more recently and has been difficult to manage.     Full history unable to be obtained -   ADL and IADL questions were asked as below, but history taking or any assessment as patient's  who accompanied her started having medical emergency and had to be taken to the ED.   Patient to be rescheduled for another appointment to complete assessment.     Past Medical History:   Diagnosis Date    Hypertension     Personal history of colonic polyps 12/12/2017      Past Surgical History:   Procedure Laterality Date    COLONOSCOPY W/ POLYPECTOMY  12/12/2017    Dr. Jared Nesbitt    TUBAL LIGATION        No family history on file.   Social History     Socioeconomic History    Marital status:      Spouse name: Александр    Number of children: 8   Occupational History    Occupation: retired   Tobacco Use    Smoking status: Never    Smokeless tobacco: Never   Substance and Sexual Activity    Alcohol use: Yes     Alcohol/week: 10.0 standard drinks     Types: 10 Cans of beer per week     Comment: weekends    Drug use: Never    Sexual activity: Not Currently     Partners: Male     Social Determinants of Health     Transportation Needs: No Transportation Needs    Lack of Transportation (Medical): No    Lack of Transportation (Non-Medical): No   Housing Stability: Low Risk     Unable to Pay for Housing in the Last Year: No    Number of Places Lived  "in the Last Year: 1    Unstable Housing in the Last Year: No        Review of patient's allergies indicates:  No Known Allergies       Current Outpatient Medications:     diclofenac sodium (VOLTAREN) 1 % Gel, Apply 4 g topically once daily. To both knees, Disp: 450 g, Rfl: 0    metoprolol tartrate (LOPRESSOR) 25 MG tablet, Take 1 tablet (25 mg total) by mouth 2 (two) times daily., Disp: 180 tablet, Rfl: 1    NIFEdipine (PROCARDIA XL) 60 MG (OSM) 24 hr tablet, Take 1 tablet (60 mg total) by mouth once daily., Disp: 90 tablet, Rfl: 1    XIIDRA 5 % Dpet, Place 1 drop into both eyes 2 (two) times daily., Disp: , Rfl:      ROS - unable to obtain ROS due to appointment cut short     Objective:   Vitals:    03/29/23 1243   BP: 135/80   BP Location: Left arm   Patient Position: Sitting   BP Method: Large (Automatic)   Pulse: 60   Resp: (!) 22   Temp: 98.6 °F (37 °C)   TempSrc: Oral   SpO2: 100%   Weight: 96 kg (211 lb 10.3 oz)   Height: 5' 2.01" (1.575 m)        Physical Exam - not done due to appointment cut short     Geriatric Assessment:     Activities of Daily Living (ADLs):  Walking independent  Transferring assistance needed  helps patient up due to bilateral knee pain  Feeding independent  Bathing independent  Toileting assistance needed patient holds onto sink to help her up, may beneift from raised toilet seat   Dressing/Grooming independent    Instrumental Activities of Daily Living (IADLs):  Finances independent  Transportation fully dependent  Cooking independent  Cleaning/Laundry independent  Shopping independent  Telephone / Communication assistance needed has list of numbers written down, does not have any numbers memorized or saved in   Medications assistance needed  reminds     Cognitive Assessment:  SLUMS performed date: 3/29/23 and scanned to patient's chart in media, Score 11/30    Depression Assessment:   Depression Patient Health Questionnaire 3/29/2023 3/29/2023 12/8/2022   Over the last " two weeks how often have you been bothered by little interest or pleasure in doing things Not at all Not at all Not at all   Over the last two weeks how often have you been bothered by feeling down, depressed or hopeless Not at all Not at all Not at all   PHQ-2 Total Score 0 0 0   Over the last two weeks how often have you been bothered by trouble falling or staying asleep, or sleeping too much Not at all - -   Over the last two weeks how often have you been bothered by feeling tired or having little energy Nearly every day - -   Over the last two weeks how often have you been bothered by a poor appetite or overeating Not at all - -   Over the last two weeks how often have you been bothered by feeling bad about yourself - or that you are a failure or have let yourself or your family down Not at all - -   Over the last two weeks how often have you been bothered by trouble concentrating on things, such as reading the newspaper or watching television Nearly every day - -   Over the last two weeks how often have you been bothered by moving or speaking so slowly that other people could have noticed. Or the opposite - being so fidgety or restless that you have been moving around a lot more than usual. Several days - -   Over the last two weeks how often have you been bothered by thoughts that you would be better off dead, or of hurting yourself Not at all - -   If you checked off any problems, how difficult have these problems made it for you to do your work, take care of things at home or get along with other people? Very difficult - -   Total Score 7 - -   Interpretation Mild - -       Mobility Assessment:   unable to be assessed due to appointment cut short     Assistive Devices:  not assessed      Social support/Living Situation: lives with  in [address] [place] house     Polypharmacy Identified? (>9 meds) No    Advanced Care Planning:  not discussed    Recent labs:  CBC:  Lab Results   Component Value Date    WBC 5.7  12/09/2022    RBC 4.73 12/09/2022    HGB 14.1 12/09/2022    HCT 43.7 12/09/2022    MCV 92.4 12/09/2022    MCH 29.8 12/09/2022    MCHC 32.3 (L) 12/09/2022    RDW 13.3 12/09/2022     12/09/2022    MPV 9.5 12/09/2022      CMP:  Sodium Level   Date Value Ref Range Status   12/09/2022 139 136 - 145 mmol/L Final     Potassium Level   Date Value Ref Range Status   12/09/2022 3.6 3.5 - 5.1 mmol/L Final     Carbon Dioxide   Date Value Ref Range Status   12/09/2022 23 23 - 31 mmol/L Final     Blood Urea Nitrogen   Date Value Ref Range Status   12/09/2022 18.0 9.8 - 20.1 mg/dL Final     Creatinine   Date Value Ref Range Status   12/09/2022 1.22 (H) 0.55 - 1.02 mg/dL Final     Calcium Level Total   Date Value Ref Range Status   12/09/2022 9.4 8.4 - 10.2 mg/dL Final     Albumin Level   Date Value Ref Range Status   12/09/2022 3.4 3.4 - 4.8 gm/dL Final     Bilirubin Total   Date Value Ref Range Status   12/09/2022 0.5 <=1.5 mg/dL Final     Alkaline Phosphatase   Date Value Ref Range Status   12/09/2022 68 40 - 150 unit/L Final     Aspartate Aminotransferase   Date Value Ref Range Status   12/09/2022 22 5 - 34 unit/L Final     Alanine Aminotransferase   Date Value Ref Range Status   12/09/2022 11 0 - 55 unit/L Final     Estimated GFR-Non    Date Value Ref Range Status   07/06/2021 60 (L) >>=90 mL/min/1.73 m2 Final      BMP:  Lab Results   Component Value Date     12/09/2022    K 3.6 12/09/2022    CO2 23 12/09/2022    BUN 18.0 12/09/2022    CREATININE 1.22 (H) 12/09/2022    CALCIUM 9.4 12/09/2022    EGFRNONAA 60 (L) 07/06/2021      Lipid Panel:  Lab Results   Component Value Date    CHOL 211 (H) 12/09/2022    CHOL 219 (H) 07/06/2021    CHOL 201 (H) 08/07/2019     Lab Results   Component Value Date    HDL 61 (H) 12/09/2022    HDL 63 (H) 07/06/2021    HDL 70 08/07/2019     No results found for: LDLCALC  Lab Results   Component Value Date    TRIG 89 12/09/2022    TRIG 119 07/06/2021    TRIG 76 08/07/2019      No results found for: CHOLHDL   HbA1c:  Lab Results   Component Value Date    HGBA1C 5.3 12/09/2022      TSH:  Lab Results   Component Value Date    TSH 0.7370 12/09/2022       Recent Imaging:  Mammo Digital Screening Bilat w/ Fito   - MAMMO DIGITAL SCREENING BILAT WITH FITO    BILATERAL DIGITAL SCREENING MAMMOGRAM 3D/2D WITH CAD: 3/23/2023  HISTORY: 72-year-old woman presents for screening mammogram.      COMPARISONS: Comparison is made to exams dated:  9/24/2019 mammogram, 8/31/2018 mammogram - [facility], 5/31/2017 mammogram, 3/4/2015 mammogram - [facility], 1/3/2014 mammogram, and 9/5/2012 mammogram - [facility].      TECHNIQUE: Digital mammography views were performed with tomosynthesis. Current study was evaluated with a Computer Aided Detection (CAD) system.     BREAST COMPOSITION: There are scattered areas of fibroglandular tissue.      FINDINGS:   No suspicious mass, asymmetry, distortion, or calcification is identified.     IMPRESSION: NEGATIVE  Right Breast: Negative (BI-RADS 1)  Left Breast: Negative (BI-RADS 1)    Recommendations:  Recommend continued annual screening mammography, according to American College of Radiology guidelines.    Hilario Cervantes M.D.            lm/:3/23/2023 18:21:20      letter sent: Mammography Normal    Mammogram BI-RADS: 1 Negative       Assessment & Plan:    Shelby Larios is presenting as above and will be treated as follows:    Shelby was seen today for memory loss.    Diagnoses and all orders for this visit:    Forgetfulness  -     Ambulatory referral/consult to Family Practice       Patient to be rescheduled for full assessment completion after  health improves. Patient's  escorted by myself to ED with checkout to ED triage staff.     Sharon Cortez MD  Attending - Family Medicine / Geriatric Medicine  [facility], [facility] Health Maintenance  Reviewed:  Immunization History   Administered Date(s) Administered    COVID-19, MRNA, LN-S, PF (MODERNA FULL 0.5 ML DOSE) 02/05/2021, 03/05/2021, 04/14/2022    Influenza (FLUAD) - Quadrivalent - Adjuvanted - PF *Preferred* (65+) 12/08/2022    Influenza - High Dose - PF (65 years and older) 12/18/2017, 01/15/2019    Influenza - Quadrivalent - High Dose - PF (65 years and older) 11/05/2020    Influenza - Quadrivalent - PF *Preferred* (6 months and older) 12/13/2015    Pneumococcal Conjugate - 20 Valent 12/08/2022    Pneumococcal Polysaccharide - 23 Valent 12/13/2015    Tdap 01/15/2019, 07/13/2019      Hepatitis Screening:   Lab Results   Component Value Date    HEPAIGM Nonreactive 12/18/2017    HEPCAB Nonreactive 12/18/2017

## 2023-06-24 ENCOUNTER — HOSPITAL ENCOUNTER (EMERGENCY)
Facility: HOSPITAL | Age: 73
Discharge: HOME | End: 2023-06-24
Attending: INTERNAL MEDICINE
Payer: COMMERCIAL

## 2023-06-24 VITALS
RESPIRATION RATE: 18 BRPM | HEIGHT: 62 IN | HEART RATE: 73 BPM | SYSTOLIC BLOOD PRESSURE: 158 MMHG | WEIGHT: 212.31 LBS | TEMPERATURE: 98 F | DIASTOLIC BLOOD PRESSURE: 79 MMHG | OXYGEN SATURATION: 98 % | BODY MASS INDEX: 39.07 KG/M2

## 2023-06-24 DIAGNOSIS — M17.11 PRIMARY OSTEOARTHRITIS OF RIGHT KNEE: Primary | ICD-10-CM

## 2023-06-24 PROCEDURE — 96372 THER/PROPH/DIAG INJ SC/IM: CPT | Performed by: INTERNAL MEDICINE

## 2023-06-24 PROCEDURE — 99285 EMERGENCY DEPT VISIT HI MDM: CPT

## 2023-06-24 PROCEDURE — 63600175 PHARM REV CODE 636 W HCPCS: Performed by: INTERNAL MEDICINE

## 2023-06-24 RX ORDER — MELOXICAM 7.5 MG/1
7.5 TABLET ORAL DAILY
Qty: 30 TABLET | Refills: 1 | Status: SHIPPED | OUTPATIENT
Start: 2023-06-24 | End: 2023-07-03

## 2023-06-24 RX ORDER — HYDROCODONE BITARTRATE AND ACETAMINOPHEN 5; 325 MG/1; MG/1
1 TABLET ORAL EVERY 12 HOURS PRN
Qty: 12 TABLET | Refills: 0 | Status: SHIPPED | OUTPATIENT
Start: 2023-06-24 | End: 2023-07-03

## 2023-06-24 RX ORDER — KETOROLAC TROMETHAMINE 30 MG/ML
30 INJECTION, SOLUTION INTRAMUSCULAR; INTRAVENOUS
Status: COMPLETED | OUTPATIENT
Start: 2023-06-24 | End: 2023-06-24

## 2023-06-24 RX ADMIN — KETOROLAC TROMETHAMINE 30 MG: 30 INJECTION, SOLUTION INTRAMUSCULAR; INTRAVENOUS at 09:06

## 2023-06-24 NOTE — ED PROVIDER NOTES
Encounter Date: 6/24/2023       History     Chief Complaint   Patient presents with    Knee Pain     Right knee pain x2 weeks no injury, ambulatory to triage.     Presents with right knee pain and swelling for months. Hx of HTN. Denies injury.    The history is provided by the patient and the spouse.   Review of patient's allergies indicates:  No Known Allergies  Past Medical History:   Diagnosis Date    Hypertension     Personal history of colonic polyps 12/12/2017     Past Surgical History:   Procedure Laterality Date    COLONOSCOPY W/ POLYPECTOMY  12/12/2017    Dr. Jared Nesbitt    TUBAL LIGATION       No family history on file.  Social History     Tobacco Use    Smoking status: Never    Smokeless tobacco: Never   Substance Use Topics    Alcohol use: Yes     Alcohol/week: 10.0 standard drinks     Types: 10 Cans of beer per week     Comment: weekends    Drug use: Never     Review of Systems   Constitutional:  Negative for fever.   HENT:  Negative for sore throat.    Respiratory:  Negative for shortness of breath.    Cardiovascular:  Negative for chest pain.   Gastrointestinal:  Negative for nausea.   Genitourinary:  Negative for dysuria.   Musculoskeletal:  Positive for arthralgias. Negative for back pain.   Skin:  Negative for rash.   Neurological:  Negative for weakness.   Hematological:  Does not bruise/bleed easily.   All other systems reviewed and are negative.    Physical Exam     Initial Vitals [06/24/23 0840]   BP Pulse Resp Temp SpO2   (!) 158/79 73 18 98.1 °F (36.7 °C) 98 %      MAP       --         Physical Exam    Nursing note and vitals reviewed.  Constitutional: She appears well-developed and well-nourished. No distress.   HENT:   Head: Normocephalic and atraumatic.   Mouth/Throat: Oropharynx is clear and moist.   Eyes: Pupils are equal, round, and reactive to light.   Neck:   Normal range of motion.  Cardiovascular:  Normal rate, regular rhythm, normal heart sounds and intact distal pulses.            Pulmonary/Chest: Breath sounds normal.   Musculoskeletal:         General: Tenderness present. No edema. Normal range of motion.      Cervical back: Normal range of motion.      Comments: Swelling with tenderness among right knee, walking with limp, N-V intact     Neurological: She is alert and oriented to person, place, and time. She has normal strength.   Skin: Skin is warm and dry. No rash noted.   Psychiatric: Her behavior is normal.       ED Course   Procedures  Labs Reviewed - No data to display       Imaging Results    None          Medications   ketorolac injection 30 mg (30 mg Intramuscular Given 6/24/23 0927)     Medical Decision Making:   History:   Old Medical Records: I decided to obtain old medical records.  Old Records Summarized: records from clinic visits and records from previous admission(s).       <> Summary of Records: Raymundo Lewis MD  [phone] 3/24/2022     Narrative & Impression  XR Knee Right 3 Views     REASON FOR EXAM: Pain     COMPARISON: Radiographs 9/3/2020     FINDINGS: Tricompartment degenerative changes with severe medial  compartment joint space narrowing. Chondrocalcinosis noted in the  lateral compartment. No acute fracture or dislocation identified.     IMPRESSION: No acute osseous process identified.        Electronically Signed By: Raymundo Lewis MD  Date/Time Signed: 03/24/2022 11:54        Specimen Collected: 03/24/22 11:09          Differential Diagnosis:   Fracture, septic joint, cellulitis, abscess, gout, RA, OA among others                          Clinical Impression:   Final diagnoses:  [M17.11] Primary osteoarthritis of right knee (Primary)        ED Disposition Condition    Discharge Stable          ED Prescriptions       Medication Sig Dispense Start Date End Date Auth. Provider    meloxicam (MOBIC) 7.5 MG tablet Take 1 tablet (7.5 mg total) by mouth once daily. 30 tablet 6/24/2023 -- Sammy Arias MD    HYDROcodone-acetaminophen (NORCO)  5-325 mg per tablet Take 1 tablet by mouth every 12 (twelve) hours as needed for Pain. 12 tablet 6/24/2023 -- Sammy Arias MD          Follow-up Information       Follow up With Specialties Details Why Contact Info    KAREL [facility] Nurse Practitioner In 2 weeks  [address] [facility]  Internal Medicine Clinic  [facility] [address]  [phone]      [facility] Emergency Medicine  If symptoms worsen [facility] [facility] [address] [phone]    [facility] Orthopedics Schedule an appointment as soon as possible for a visit in 1 month  [address] [facility] [address] [phone]             Sammy Arias MD  06/24/23 0973

## 2023-07-03 ENCOUNTER — OFFICE VISIT (OUTPATIENT)
Dept: FAMILY MEDICINE | Facility: CLINIC | Age: 73
End: 2023-07-03
Payer: COMMERCIAL

## 2023-07-03 ENCOUNTER — HOSPITAL ENCOUNTER (OUTPATIENT)
Dept: RADIOLOGY | Facility: HOSPITAL | Age: 73
Discharge: HOME | End: 2023-07-03
Attending: STUDENT IN AN ORGANIZED HEALTH CARE EDUCATION/TRAINING PROGRAM
Payer: COMMERCIAL

## 2023-07-03 VITALS
SYSTOLIC BLOOD PRESSURE: 150 MMHG | DIASTOLIC BLOOD PRESSURE: 82 MMHG | HEIGHT: 62 IN | TEMPERATURE: 98 F | WEIGHT: 211 LBS | RESPIRATION RATE: 22 BRPM | HEART RATE: 97 BPM | BODY MASS INDEX: 38.83 KG/M2 | OXYGEN SATURATION: 100 %

## 2023-07-03 DIAGNOSIS — N18.31 STAGE 3A CHRONIC KIDNEY DISEASE: ICD-10-CM

## 2023-07-03 DIAGNOSIS — R41.3 MEMORY LOSS: ICD-10-CM

## 2023-07-03 DIAGNOSIS — I10 PRIMARY HYPERTENSION: Primary | ICD-10-CM

## 2023-07-03 DIAGNOSIS — M17.0 BILATERAL PRIMARY OSTEOARTHRITIS OF KNEE: ICD-10-CM

## 2023-07-03 LAB
ALBUMIN SERPL-MCNC: 3.7 G/DL (ref 3.4–4.8)
ALBUMIN/GLOB SERPL: 0.8 RATIO (ref 1.1–2)
ALP SERPL-CCNC: 74 UNIT/L (ref 40–150)
ALT SERPL-CCNC: 11 UNIT/L (ref 0–55)
AST SERPL-CCNC: 23 UNIT/L (ref 5–34)
BASOPHILS # BLD AUTO: 0.03 X10(3)/MCL
BASOPHILS NFR BLD AUTO: 0.5 %
BILIRUBIN DIRECT+TOT PNL SERPL-MCNC: 0.4 MG/DL
BUN SERPL-MCNC: 17.1 MG/DL (ref 9.8–20.1)
CALCIUM SERPL-MCNC: 9.5 MG/DL (ref 8.4–10.2)
CHLORIDE SERPL-SCNC: 108 MMOL/L (ref 98–107)
CHOLEST SERPL-MCNC: 232 MG/DL
CHOLEST/HDLC SERPL: 3 {RATIO} (ref 0–5)
CO2 SERPL-SCNC: 22 MMOL/L (ref 23–31)
CREAT SERPL-MCNC: 0.99 MG/DL (ref 0.55–1.02)
EOSINOPHIL # BLD AUTO: 0.02 X10(3)/MCL (ref 0–0.9)
EOSINOPHIL NFR BLD AUTO: 0.4 %
ERYTHROCYTE [DISTWIDTH] IN BLOOD BY AUTOMATED COUNT: 13.8 % (ref 11.5–17)
GFR SERPLBLD CREATININE-BSD FMLA CKD-EPI: >60 MLS/MIN/1.73/M2
GLOBULIN SER-MCNC: 4.4 GM/DL (ref 2.4–3.5)
GLUCOSE SERPL-MCNC: 100 MG/DL (ref 82–115)
HCT VFR BLD AUTO: 44.9 % (ref 37–47)
HDLC SERPL-MCNC: 68 MG/DL (ref 35–60)
HGB BLD-MCNC: 14.8 G/DL (ref 12–16)
HIV 1+2 AB+HIV1 P24 AG SERPL QL IA: NONREACTIVE
IMM GRANULOCYTES # BLD AUTO: 0.01 X10(3)/MCL (ref 0–0.04)
IMM GRANULOCYTES NFR BLD AUTO: 0.2 %
LDLC SERPL CALC-MCNC: 146 MG/DL (ref 50–140)
LYMPHOCYTES # BLD AUTO: 1.29 X10(3)/MCL (ref 0.6–4.6)
LYMPHOCYTES NFR BLD AUTO: 23.6 %
MCH RBC QN AUTO: 29.5 PG (ref 27–31)
MCHC RBC AUTO-ENTMCNC: 33 G/DL (ref 33–36)
MCV RBC AUTO: 89.4 FL (ref 80–94)
MONOCYTES # BLD AUTO: 0.53 X10(3)/MCL (ref 0.1–1.3)
MONOCYTES NFR BLD AUTO: 9.7 %
NEUTROPHILS # BLD AUTO: 3.59 X10(3)/MCL (ref 2.1–9.2)
NEUTROPHILS NFR BLD AUTO: 65.6 %
NRBC BLD AUTO-RTO: 0 %
PLATELET # BLD AUTO: 252 X10(3)/MCL (ref 130–400)
PMV BLD AUTO: 10 FL (ref 7.4–10.4)
POTASSIUM SERPL-SCNC: 3.7 MMOL/L (ref 3.5–5.1)
PROT SERPL-MCNC: 8.1 GM/DL (ref 5.8–7.6)
RBC # BLD AUTO: 5.02 X10(6)/MCL (ref 4.2–5.4)
SODIUM SERPL-SCNC: 141 MMOL/L (ref 136–145)
T PALLIDUM AB SER QL: NONREACTIVE
TRIGL SERPL-MCNC: 92 MG/DL (ref 37–140)
TSH SERPL-ACNC: 0.7 UIU/ML (ref 0.35–4.94)
VIT B12 SERPL-MCNC: 465 PG/ML (ref 213–816)
VLDLC SERPL CALC-MCNC: 18 MG/DL
WBC # SPEC AUTO: 5.47 X10(3)/MCL (ref 4.5–11.5)

## 2023-07-03 PROCEDURE — 84443 ASSAY THYROID STIM HORMONE: CPT | Performed by: FAMILY MEDICINE

## 2023-07-03 PROCEDURE — 36415 COLL VENOUS BLD VENIPUNCTURE: CPT | Performed by: FAMILY MEDICINE

## 2023-07-03 PROCEDURE — 86780 TREPONEMA PALLIDUM: CPT | Performed by: FAMILY MEDICINE

## 2023-07-03 PROCEDURE — 73564 X-RAY EXAM KNEE 4 OR MORE: CPT | Mod: TC,RT

## 2023-07-03 PROCEDURE — 99214 OFFICE O/P EST MOD 30 MIN: CPT | Mod: PBBFAC | Performed by: FAMILY MEDICINE

## 2023-07-03 PROCEDURE — 82607 VITAMIN B-12: CPT | Performed by: FAMILY MEDICINE

## 2023-07-03 PROCEDURE — 80061 LIPID PANEL: CPT | Performed by: FAMILY MEDICINE

## 2023-07-03 PROCEDURE — 87389 HIV-1 AG W/HIV-1&-2 AB AG IA: CPT | Performed by: FAMILY MEDICINE

## 2023-07-03 PROCEDURE — 80053 COMPREHEN METABOLIC PANEL: CPT | Performed by: FAMILY MEDICINE

## 2023-07-03 PROCEDURE — 85025 COMPLETE CBC W/AUTO DIFF WBC: CPT | Performed by: FAMILY MEDICINE

## 2023-07-03 RX ORDER — NAPROXEN SODIUM 220 MG/1
81 TABLET, FILM COATED ORAL DAILY
COMMUNITY

## 2023-07-03 RX ORDER — AMLODIPINE BESYLATE 10 MG/1
10 TABLET ORAL DAILY
Qty: 30 TABLET | Refills: 1 | Status: SHIPPED | OUTPATIENT
Start: 2023-07-03 | End: 2023-10-25 | Stop reason: SDUPTHER

## 2023-07-03 NOTE — PROGRESS NOTES
[facility]  [facility] Note    DOS: 7/3/2023      Subjective:  Chief Complaint:    Chief Complaint   Patient presents with    Follow-up    Memory Loss    Knee Pain       History of Present Illness:  Shelby Larios is a 72 y.o. female with a PMH of HTN, CKD, bilateral knee OA, dizziness and frequent falls     Presents to geriatric clinic today for assessment of cognitive impairment:    Here with daughter and  today   Having some progressive issues with memory loss over the past 1-2 years   No longer driving because she was getting lost   Today she reports minimal issues with memory   Occasionally forgets things around the house and what she has recently done   Denies forgetting any family members names or getting lost in familiar places   She is high functioning and reports good mobility without any assistant devices   No recent falls   No bladder or bowel incontinence or constipation   Hx of dementia in her mother and father   No behavorial symptoms   Lives with  and is independent of all ADLs       Past Medical History:   Diagnosis Date    Hypertension     Personal history of colonic polyps 12/12/2017      Past Surgical History:   Procedure Laterality Date    COLONOSCOPY W/ POLYPECTOMY  12/12/2017    Dr. Jared Nesbitt    TUBAL LIGATION        History reviewed. No pertinent family history.   Social History     Socioeconomic History    Marital status:      Spouse name: Александр    Number of children: 8   Occupational History    Occupation: retired   Tobacco Use    Smoking status: Never    Smokeless tobacco: Never   Substance and Sexual Activity    Alcohol use: Yes     Alcohol/week: 10.0 standard drinks     Types: 10 Cans of beer per week     Comment: weekends    Drug use: Never    Sexual activity: Not Currently     Partners: Male     Social Determinants of Health     Transportation Needs: No Transportation Needs    Lack of Transportation  "(Medical): No    Lack of Transportation (Non-Medical): No   Housing Stability: Low Risk     Unable to Pay for Housing in the Last Year: No    Number of Places Lived in the Last Year: 1    Unstable Housing in the Last Year: No        Review of patient's allergies indicates:  No Known Allergies       Current Outpatient Medications:     aspirin 81 MG Chew, Take 81 mg by mouth once daily., Disp: , Rfl:     amLODIPine (NORVASC) 10 MG tablet, Take 1 tablet (10 mg total) by mouth once daily., Disp: 30 tablet, Rfl: 1    diclofenac sodium (VOLTAREN) 1 % Gel, Apply 4 g topically once daily. To both knees, Disp: 450 g, Rfl: 0    XIIDRA 5 % Dpet, Place 1 drop into both eyes 2 (two) times daily., Disp: , Rfl:      Review of Systems   Constitutional:  Negative for chills, fever and weight loss.   Respiratory:  Negative for cough and shortness of breath.    Cardiovascular:  Negative for chest pain, palpitations and leg swelling.   Gastrointestinal:  Negative for blood in stool, constipation, melena, nausea and vomiting.      Objective:   Vitals:    07/03/23 1308   BP: (!) 150/82   BP Location: Left arm   Patient Position: Sitting   BP Method: X-Large (Automatic)   Pulse: 97   Resp: (!) 22   Temp: 98.2 °F (36.8 °C)   TempSrc: Oral   SpO2: 100%   Weight: 95.7 kg (210 lb 15.7 oz)   Height: 5' 2.01" (1.575 m)        Physical Exam  Constitutional:       General: She is not in acute distress.     Appearance: She is obese. She is not toxic-appearing.   HENT:      Head: Normocephalic and atraumatic.      Mouth/Throat:      Mouth: Mucous membranes are moist.   Eyes:      Extraocular Movements: Extraocular movements intact.      Conjunctiva/sclera: Conjunctivae normal.   Cardiovascular:      Rate and Rhythm: Normal rate and regular rhythm.      Heart sounds: No murmur heard.    No friction rub. No gallop.   Pulmonary:      Effort: Pulmonary effort is normal. No respiratory distress.      Breath sounds: Normal breath sounds. No stridor. No " wheezing or rales.   Abdominal:      General: Abdomen is flat. Bowel sounds are normal. There is no distension.      Palpations: Abdomen is soft.      Tenderness: There is no abdominal tenderness. There is no guarding.   Musculoskeletal:      Cervical back: Neck supple. No tenderness.      Right lower leg: No edema.      Left lower leg: No edema.      Comments: Knees non tender bilaterally, no swelling or erythema, good ROM and strength bilaterally    Lymphadenopathy:      Cervical: No cervical adenopathy.   Skin:     General: Skin is warm.   Neurological:      General: No focal deficit present.      Mental Status: She is alert and oriented to person, place, and time.      Geriatric Assessment:     Activities of Daily Living (ADLs):  Walking independent  Transferring independent  Feeding independent  Bathing independent  Toileting independent  Dressing/Grooming independent    Instrumental Activities of Daily Living (IADLs):  Finances: Needs assistance per    Transportation fully dependent  Cooking independent  Cleaning/Laundry independent  Shopping independent  Telephone / Communication independent  Medications independent    Cognitive Assessment:  SLUMS performed date: 3/29/23 and scanned to patient's chart in media, Score 11/30    Depression Assessment:   Depression Patient Health Questionnaire 7/3/2023 3/29/2023 3/29/2023 12/8/2022   Over the last two weeks how often have you been bothered by little interest or pleasure in doing things Not at all Not at all Not at all Not at all   Over the last two weeks how often have you been bothered by feeling down, depressed or hopeless Not at all Not at all Not at all Not at all   PHQ-2 Total Score 0 0 0 0   Over the last two weeks how often have you been bothered by trouble falling or staying asleep, or sleeping too much - Not at all - -   Over the last two weeks how often have you been bothered by feeling tired or having little energy - Nearly every day - -   Over  the last two weeks how often have you been bothered by a poor appetite or overeating - Not at all - -   Over the last two weeks how often have you been bothered by feeling bad about yourself - or that you are a failure or have let yourself or your family down - Not at all - -   Over the last two weeks how often have you been bothered by trouble concentrating on things, such as reading the newspaper or watching television - Nearly every day - -   Over the last two weeks how often have you been bothered by moving or speaking so slowly that other people could have noticed. Or the opposite - being so fidgety or restless that you have been moving around a lot more than usual. - Several days - -   Over the last two weeks how often have you been bothered by thoughts that you would be better off dead, or of hurting yourself - Not at all - -   If you checked off any problems, how difficult have these problems made it for you to do your work, take care of things at home or get along with other people? - Very difficult - -   Total Score - 7 - -   Interpretation - Mild - -       Mobility Assessment: Please perform at next visit     Assistive Devices:   none  Glasses: Yes  Hearing Aids: No    Social support/Living Situation: Lives with      Polypharmacy Identified? (>9 meds) No    Advanced Care Planning:  - [place] POST: not done yet, counseled patient and information provided  - Medical POA: not done yet, counseled patient and information provided    Recent labs:  CBC:  Lab Results   Component Value Date    WBC 5.7 12/09/2022    RBC 4.73 12/09/2022    HGB 14.1 12/09/2022    HCT 43.7 12/09/2022    MCV 92.4 12/09/2022    MCH 29.8 12/09/2022    MCHC 32.3 (L) 12/09/2022    RDW 13.3 12/09/2022     12/09/2022    MPV 9.5 12/09/2022      CMP:  Sodium Level   Date Value Ref Range Status   12/09/2022 139 136 - 145 mmol/L Final     Potassium Level   Date Value Ref Range Status   12/09/2022 3.6 3.5 - 5.1 mmol/L Final     Carbon  Dioxide   Date Value Ref Range Status   12/09/2022 23 23 - 31 mmol/L Final     Blood Urea Nitrogen   Date Value Ref Range Status   12/09/2022 18.0 9.8 - 20.1 mg/dL Final     Creatinine   Date Value Ref Range Status   12/09/2022 1.22 (H) 0.55 - 1.02 mg/dL Final     Calcium Level Total   Date Value Ref Range Status   12/09/2022 9.4 8.4 - 10.2 mg/dL Final     Albumin Level   Date Value Ref Range Status   12/09/2022 3.4 3.4 - 4.8 gm/dL Final     Bilirubin Total   Date Value Ref Range Status   12/09/2022 0.5 <=1.5 mg/dL Final     Alkaline Phosphatase   Date Value Ref Range Status   12/09/2022 68 40 - 150 unit/L Final     Aspartate Aminotransferase   Date Value Ref Range Status   12/09/2022 22 5 - 34 unit/L Final     Alanine Aminotransferase   Date Value Ref Range Status   12/09/2022 11 0 - 55 unit/L Final     Estimated GFR-Non    Date Value Ref Range Status   07/06/2021 60 (L) >>=90 mL/min/1.73 m2 Final      BMP:  Lab Results   Component Value Date     12/09/2022    K 3.6 12/09/2022    CO2 23 12/09/2022    BUN 18.0 12/09/2022    CREATININE 1.22 (H) 12/09/2022    CALCIUM 9.4 12/09/2022    EGFRNONAA 60 (L) 07/06/2021      Lipid Panel:  Lab Results   Component Value Date    CHOL 211 (H) 12/09/2022    CHOL 219 (H) 07/06/2021    CHOL 201 (H) 08/07/2019     Lab Results   Component Value Date    HDL 61 (H) 12/09/2022    HDL 63 (H) 07/06/2021    HDL 70 08/07/2019     No results found for: LDLCALC  Lab Results   Component Value Date    TRIG 89 12/09/2022    TRIG 119 07/06/2021    TRIG 76 08/07/2019     No results found for: CHOLHDL   HbA1c:  Lab Results   Component Value Date    HGBA1C 5.3 12/09/2022      TSH:  Lab Results   Component Value Date    TSH 0.7370 12/09/2022       Recent Imaging:  Mammo Digital Screening Bilat w/ Amanda   - MAMMO DIGITAL SCREENING BILAT WITH AMANDA    BILATERAL DIGITAL SCREENING MAMMOGRAM 3D/2D WITH CAD: 3/23/2023  HISTORY: 72-year-old woman presents for screening mammogram.       COMPARISONS: Comparison is made to exams dated:  9/24/2019 mammogram, 8/31/2018 mammogram - [facility], 5/31/2017 mammogram, 3/4/2015 mammogram - [facility], 1/3/2014 mammogram, and 9/5/2012 mammogram - [facility].      TECHNIQUE: Digital mammography views were performed with tomosynthesis. Current study was evaluated with a Computer Aided Detection (CAD) system.     BREAST COMPOSITION: There are scattered areas of fibroglandular tissue.      FINDINGS:   No suspicious mass, asymmetry, distortion, or calcification is identified.     IMPRESSION: NEGATIVE  Right Breast: Negative (BI-RADS 1)  Left Breast: Negative (BI-RADS 1)    Recommendations:  Recommend continued annual screening mammography, according to American College of Radiology guidelines.    Hilario Cervantes M.D.            lm/:3/23/2023 18:21:20      letter sent: Mammography Normal    Mammogram BI-RADS: 1 Negative       Assessment & Plan:    Shelby Larios is presenting as above and will be treated as follows:    1. Primary hypertension  - Above goal   - Discontinue Procardia   - Start Norvasc 10mg daily   - Monitor BP at home and bring log to follow up visit     2. Memory loss  - TSH, RPR, B12, HIV, lipids    - Suspect multifactorial, can consider MRI imaging in future     3. Stage 3a chronic kidney disease  - CMP today     4. Bilateral primary osteoarthritis of knee  - Repeat knee XR   - Home exercise program   - Heat/ice therapy   - Tylenol PRN   - Avoid NSAIDs given CKD   - Discussed CSI in future if no further improvement with conservative measures   - Will check with insurance for PA     RTC 2 weeks for BP check and follow up labs       Health Maintenance Reviewed:  Immunization History   Administered Date(s) Administered    COVID-19, MRNA, LN-S, PF (MODERNA FULL 0.5 ML DOSE) 02/05/2021, 03/05/2021, 04/14/2022    Influenza (FLUAD) - Quadrivalent - Adjuvanted - PF *Preferred* (65+)  12/08/2022    Influenza - High Dose - PF (65 years and older) 12/18/2017, 01/15/2019    Influenza - Quadrivalent - High Dose - PF (65 years and older) 11/05/2020    Influenza - Quadrivalent - PF *Preferred* (6 months and older) 12/13/2015    Pneumococcal Conjugate - 20 Valent 12/08/2022    Pneumococcal Polysaccharide - 23 Valent 12/13/2015    Tdap 01/15/2019, 07/13/2019        - Covid done, date: 4/2022  - Flu done, date: 12/2022  - Gardasil Not done   - Prevnar done, date: 12/2022  - Shingles patient declined  - Tetanus done, date: 1/2019     HIV Screening:  No results found for: HIV1X2, [id number] not done yet, counseled patient and information provided  Hepatitis Screening:   Lab Results   Component Value Date    HEPAIGM Nonreactive 12/18/2017    HEPCAB Nonreactive 12/18/2017      Breast Ca Screening: BIRADS I bilaterally, 12/2022   Cervical Ca Screening:    Colon Ca Screening:     [place] guard negative 1/2023   Lung Ca Screening: Non smoker   Osteoporosis Screening: Needs DEXA

## 2023-07-13 ENCOUNTER — OFFICE VISIT (OUTPATIENT)
Dept: ORTHOPEDICS | Facility: CLINIC | Age: 73
End: 2023-07-13
Payer: COMMERCIAL

## 2023-07-13 ENCOUNTER — HOSPITAL ENCOUNTER (OUTPATIENT)
Dept: RADIOLOGY | Facility: HOSPITAL | Age: 73
Discharge: HOME | End: 2023-07-13
Attending: STUDENT IN AN ORGANIZED HEALTH CARE EDUCATION/TRAINING PROGRAM
Payer: COMMERCIAL

## 2023-07-13 VITALS
SYSTOLIC BLOOD PRESSURE: 146 MMHG | DIASTOLIC BLOOD PRESSURE: 88 MMHG | HEART RATE: 79 BPM | HEIGHT: 62 IN | WEIGHT: 213.63 LBS | BODY MASS INDEX: 39.31 KG/M2

## 2023-07-13 DIAGNOSIS — I10 PRIMARY HYPERTENSION: ICD-10-CM

## 2023-07-13 DIAGNOSIS — M17.11 PRIMARY OSTEOARTHRITIS OF RIGHT KNEE: Primary | ICD-10-CM

## 2023-07-13 DIAGNOSIS — M17.11 PRIMARY OSTEOARTHRITIS OF RIGHT KNEE: ICD-10-CM

## 2023-07-13 DIAGNOSIS — R73.03 PREDIABETES: ICD-10-CM

## 2023-07-13 PROCEDURE — 20610 DRAIN/INJ JOINT/BURSA W/O US: CPT | Mod: PBBFAC | Performed by: STUDENT IN AN ORGANIZED HEALTH CARE EDUCATION/TRAINING PROGRAM

## 2023-07-13 PROCEDURE — 99213 OFFICE O/P EST LOW 20 MIN: CPT | Mod: PBBFAC,25

## 2023-07-13 PROCEDURE — 73564 X-RAY EXAM KNEE 4 OR MORE: CPT | Mod: TC,RT

## 2023-07-13 RX ORDER — ACETAMINOPHEN 500 MG
1000 TABLET ORAL EVERY 8 HOURS PRN
Qty: 180 TABLET | Refills: 0 | Status: SHIPPED | OUTPATIENT
Start: 2023-07-13

## 2023-07-13 RX ORDER — LIDOCAINE HYDROCHLORIDE 10 MG/ML
5 INJECTION, SOLUTION EPIDURAL; INFILTRATION; INTRACAUDAL; PERINEURAL
Status: COMPLETED | OUTPATIENT
Start: 2023-07-13 | End: 2023-07-13

## 2023-07-13 RX ORDER — TRIAMCINOLONE ACETONIDE 40 MG/ML
40 INJECTION, SUSPENSION INTRA-ARTICULAR; INTRAMUSCULAR ONCE
Status: COMPLETED | OUTPATIENT
Start: 2023-07-13 | End: 2023-07-13

## 2023-07-13 RX ADMIN — TRIAMCINOLONE ACETONIDE 40 MG: 40 INJECTION, SUSPENSION INTRA-ARTICULAR; INTRAMUSCULAR at 11:07

## 2023-07-13 RX ADMIN — LIDOCAINE HYDROCHLORIDE 50 MG: 10 INJECTION, SOLUTION EPIDURAL; INFILTRATION; INTRACAUDAL; PERINEURAL at 11:07

## 2023-07-13 NOTE — PROGRESS NOTES
"Subjective:    Patient ID: Shelby Larios is a 72 y.o. female  who presented to [facility] for new visit.    Chief Complaint: Pain of the Right Knee    History of Present Illness:    Shelby Larios who has a history of right knee arthritis  presented today with with knee pain involving the right knee for the past 8 years. Pain is located medial knee. Quality of pain is described as Burning and Aching.  Inciting event: injured during a fall while walking . Pain is aggravated by rising after sitting.  Patient has had prior knee problems. Evaluation to date: none. Treatment to date: avoidance of activity. Expectations for today's visit includes CSI.  PC is Elicia Schmidt NP.    Knee Review of Systems:  Swelling?  Yes  Instability?  No  Mechanical sx?  No  <30 min AM stiffness? Yes  Limited ROM? No  Fever/Chills? No    Current Choice of Exercise:  Walking    Objective:      Physical Exam:    BP (!) 146/88   Pulse 79   Ht 5' 2" (1.575 m)   Wt 96.9 kg (213 lb 9.6 oz)   BMI 39.07 kg/m²     Ortho/SPM Exam    Appearance:  Normal gait/station  FWB  Alignment: Left: normal Right: normal   Soft tissue swelling: Left: no Right: yes  Effusion: Left:  Negative Right: Negative  Erythema: Left no Right: no  Ecchymosis: Left: no Right: no  Atrophy: Left: no Right: no    Palpation:  Knee Tenderness: Left: None Right:  Quadriceps tendon    Range of motion:  Flexion (140): Left:  140 Right: 140  Extension (0): Left: 0 Right: 0    Strength:  Extension: Left 5/5  Pain: No     Right 5/5 Pain: No  Flexion: Left 5/5 Pain: No Right   5/5 Pain: No        Special Tests:  Ballotable Effusion:Left: Negative Right: Negative   Fluid Wave: Left: Negative Right: Negative   Crepitus: Left: Negative Right: Positive   Patellar grind test: Left: Negative  Right: Positive  Apprehension test: Left: Negative Right: Negative   Varus: @ 0, Left Negative Right: Negative.  @ 30, Left Negative  Right " Negative   Valgus: @ 0, Left Negative Right: Negative.  @ 30, Left Negative  Right Negative  Lachman: Left: Not performed Right: Negative   Ant Drawer: Left: Not performed Right: Negative   Posterior Drawer: Left: Not performed Right: Negative   Dial Test: Left: Not performed Right: Not performed   Keisha: Left: Not performed Right: Negative   Apley's: Left: Not performed Right: Not performed  Thessaly's: Left: Not performed Right: Not performed   Noble Compression: Left: Not performed Right: Not performed   Shante: Left: Not performed Right: Not performed       General appearance: NAD  Peripheral pulses: normal bilaterally   Sensation: Intact bilaterally    Labs:  Last A1c: 5.3     Imaging:   Previous images reviewed.  X-rays ordered and performed today: Yes  # of views: 4 Laterality: right  My Interpretation:  shows DJD changes, likely chronic. KL Grade 4 OA. Significant joint space narrowing with sclerotic changes. Osteophytes present throughout patella, femur and tibial plateau.    Assessment:        Encounter Diagnoses   Code Name Primary?    M17.11 Primary osteoarthritis of right knee Yes    Z68.39 BMI 39.0-39.9,adult     R73.03 Prediabetes     I10 Primary hypertension         Plan:           Orders Placed This Encounter   Procedures    X-Ray Knee Complete 4 Or More Views Right     Standing Status:   Future     Number of Occurrences:   1     Standing Expiration Date:   7/13/2024     Order Specific Question:   May the Radiologist modify the order per protocol to meet the clinical needs of the patient?     Answer:   Yes     Order Specific Question:   Release to patient     Answer:   Immediate          MDM: Prior external referring provider notes reviewed. Prior external referring provider studies reviewed.   Dx: right Knee Osteoarthritis.    Treatment Plan: Discussed with patient diagnosis, prognosis, and treatment recommendations. Education provided.    Encouraged to keep up walking with , but increase  frequency/duration as tolerated.   Formal PT script provided to patient. You may take this script to whichever physical therapist you would like to go to.   Imaging: Radiological studies ordered and independently reviewed; discussed with patient; agree with radiologist interpretation.   Weight Management: is paramount. BMI >35; recommend to discuss with PCP about bariatric surgery options if applicable. A BMI 24.9 or less may provide further relief.  Procedure: Discussed CSI vs VS injections as treatment options; since conservative measures did not improve symptoms patient consented for CSI today.  Activity: Activity as tolerated; HEP to include aerobic conditioning and strength training with non-painful activity. ROM/STG exercises. Proper footware; assistive devises to avoid limping.   Therapy: Physical Therapy  Medication: first line treatment with daily acetaminophen. Up to 1000 mg three times daily can be taken; medication precautions given.. Please see your primary care physician for further refills.  RTC: PRN; call if any issues    Large Joint Aspiration/Injection: R knee    Date/Time: 7/13/2023 10:30 AM  Performed by: Tristen Vallecillo MD  Authorized by: Arturo Carrasquillo MD     Consent Done?:  Yes (Written)  Indications:  Arthritis and pain  Site marked: the procedure site was marked    Timeout: prior to procedure the correct patient, procedure, and site was verified    Prep: patient was prepped and draped in usual sterile fashion      Local anesthesia used?: Yes    Local anesthetic:  Topical anesthetic    Details:  Needle Size:  21 G  Ultrasonic Guidance for needle placement?: No    Approach:  Anterolateral  Location:  Knee  Site:  R knee  Patient tolerance:  Patient tolerated the procedure well with no immediate complications     [facility] Procedure Note     Date: 07/13/2023  Time: 10:30 AM CDT     Indications: Therapeutic Indication - Decrease pain, Increase range of motion and improve quality of life:   Risks:   Possible complications with the injection include bleeding, infection (.01%), tendon rupture, steroid flare, fat pad or soft tissue atrophy, skin depigmentation, allergic reaction to medications and vasovagal response. (steroid flare treatment is rest, ice, NSAIDs and resolves in 24-36 hours.)  Consent:  Procedure, risks, benefits, and alternatives explained the patient, who voiced understanding and agreed to proceed with procedure.  Consent signed and scanned into the medical record.   No absolute contraindications (cellulitis overlying joint, infection, lack of informed consent, allergy to injection medication, AVN protein or egg allergy for sodium hyaluronate, or history of steroid flare) or relative contraindications (uncontrolled DM2 A1c>10, coagulopathy, INR > 3.5, previous joint replacement or history of AVN).        Staff: Arturo Carrasquillo MD   Description:  Time-out performed.  The patient was prepped in normal sterile fashion use of chlorhexidine scrub and the appropriate and anatomic landmarks were identified without ultrasound.  Sterile 21G needle was used. Topical ethyl chloride was applied. Contents of syringe included: 5cc of 1% of lidocaine with 40mg of Kenalog.    Complications: None     EBL: None     Post Procedure: Patient alert, and moving all extremities. ROM improved, pain decreased to 0/10.  Good peripheral pulses, no signs of vascular compromise and range of motion intact.  Aftercare instructions were given to patient at time of discharge.  Relative rest for 3 days-avoiding excess activity.  Place ice on the area for 15 minutes every 4-6 hours. Patient may take Tylenol a 1000 mg b.i.d. or ibuprofen 600 mg t.i.d. for the next 3-4 days if not on medication already and safe to take pending co-morbidities.  Protect the area for the next 1-8 hours if anesthetic was used.  Avoid excessive activity for the next 3-4 weeks.  ER precautions given for fever, severe joint pain or allergic reaction or  other new symptoms related to the joint injection.

## 2023-07-14 NOTE — PROGRESS NOTES
Faculty Attestation: Shelby Larios  was seen in Sports Medicine Clinic. Patient seen and evaluated at the time of the visit. History of Present Illness, Physical Exam, and Assessment and Plan reviewed. Treatment plan is reasonable and appropriate. Compliance with treatment recommendations is important.  Radiology images independently reviewed and agree with resident interpretation.  Procedure note reviewed. Present for entire procedure with the resident. Patient tolerated procedure well.      Arturo Carrasquillo MD

## 2023-07-19 ENCOUNTER — OFFICE VISIT (OUTPATIENT)
Dept: FAMILY MEDICINE | Facility: CLINIC | Age: 73
End: 2023-07-19
Payer: COMMERCIAL

## 2023-07-19 VITALS
DIASTOLIC BLOOD PRESSURE: 76 MMHG | HEART RATE: 65 BPM | HEIGHT: 62 IN | WEIGHT: 211.44 LBS | TEMPERATURE: 98 F | OXYGEN SATURATION: 100 % | BODY MASS INDEX: 38.91 KG/M2 | SYSTOLIC BLOOD PRESSURE: 140 MMHG | RESPIRATION RATE: 20 BRPM

## 2023-07-19 DIAGNOSIS — G31.84 MILD COGNITIVE IMPAIRMENT WITH MEMORY LOSS: ICD-10-CM

## 2023-07-19 DIAGNOSIS — E78.5 HYPERLIPIDEMIA, UNSPECIFIED HYPERLIPIDEMIA TYPE: ICD-10-CM

## 2023-07-19 DIAGNOSIS — I10 PRIMARY HYPERTENSION: ICD-10-CM

## 2023-07-19 DIAGNOSIS — G47.33 OSA (OBSTRUCTIVE SLEEP APNEA): Primary | ICD-10-CM

## 2023-07-19 PROCEDURE — 99215 OFFICE O/P EST HI 40 MIN: CPT | Mod: PBBFAC | Performed by: FAMILY MEDICINE

## 2023-07-19 RX ORDER — ATORVASTATIN CALCIUM 20 MG/1
20 TABLET, FILM COATED ORAL DAILY
Qty: 30 TABLET | Refills: 0 | Status: SHIPPED | OUTPATIENT
Start: 2023-07-19 | End: 2023-08-21 | Stop reason: SDUPTHER

## 2023-07-19 RX ORDER — LISINOPRIL 10 MG/1
10 TABLET ORAL DAILY
Qty: 30 TABLET | Refills: 0 | Status: SHIPPED | OUTPATIENT
Start: 2023-07-19 | End: 2023-08-21 | Stop reason: SDUPTHER

## 2023-07-19 NOTE — PROGRESS NOTES
[facility]  [facility] Note    DOS: 7/19/2023      Subjective:  Chief Complaint:    Chief Complaint   Patient presents with    Follow-up    Hypertension       History of Present Illness:  Shelby Larios is a 72 y.o. female with a PMH of HTN, CKD, bilateral knee OA, dizziness and frequent falls    Here for follow up today, last seen 7/3/23   BP regimen was adjusted and Procardia was discontinued and changed to amlodipine   Labs reviewed   She was seen in [facility] and had R knee injection on 7/13/23   Knee pain significantly improved w/ CSI   Tolerating amlodipine without issue   Of note, does have hx of VIC   Used CPAP in the past but stop using   Has significant daytime sleepiness and snoring at nighttime   Reports she is willing to try and use again if it will improve her health and memory   No recent falls     The 10-year ASCVD risk score (Lisa ALFONSO, et al., 2019) is: 18.8%    Values used to calculate the score:      Age: 72 years      Sex: Female      Is Non- : Yes      Diabetic: No      Tobacco smoker: No      Systolic Blood Pressure: 143 mmHg      Is BP treated: Yes      HDL Cholesterol: 68 mg/dL      Total Cholesterol: 232 mg/dL      Past Medical History:   Diagnosis Date    Hypertension     Personal history of colonic polyps 12/12/2017      Past Surgical History:   Procedure Laterality Date    COLONOSCOPY W/ POLYPECTOMY  12/12/2017    Dr. Jared Nesbitt    TUBAL LIGATION        Family History   Family history unknown: Yes      Social History     Socioeconomic History    Marital status:      Spouse name: Александр    Number of children: 8   Occupational History    Occupation: retired   Tobacco Use    Smoking status: Never    Smokeless tobacco: Never   Substance and Sexual Activity    Alcohol use: Yes     Alcohol/week: 10.0 standard drinks     Types: 10 Cans of beer per week     Comment: weekends    Drug use: Never    Sexual activity:  Not Currently     Partners: Male     Social Determinants of Health     Transportation Needs: No Transportation Needs    Lack of Transportation (Medical): No    Lack of Transportation (Non-Medical): No   Housing Stability: Low Risk     Unable to Pay for Housing in the Last Year: No    Number of Places Lived in the Last Year: 1    Unstable Housing in the Last Year: No        Health Maintenance Reviewed:  Immunization History   Administered Date(s) Administered    COVID-19, MRNA, LN-S, PF (MODERNA FULL 0.5 ML DOSE) 02/05/2021, 03/05/2021, 04/14/2022    Influenza (FLUAD) - Quadrivalent - Adjuvanted - PF *Preferred* (65+) 12/08/2022    Influenza - High Dose - PF (65 years and older) 12/18/2017, 01/15/2019    Influenza - Quadrivalent - High Dose - PF (65 years and older) 11/05/2020    Influenza - Quadrivalent - PF *Preferred* (6 months and older) 12/13/2015    Pneumococcal Conjugate - 20 Valent 12/08/2022    Pneumococcal Polysaccharide - 23 Valent 12/13/2015    Tdap 01/15/2019, 07/13/2019        Review of patient's allergies indicates:  No Known Allergies       Current Outpatient Medications:     acetaminophen (TYLENOL) 500 MG tablet, Take 2 tablets (1,000 mg total) by mouth every 8 (eight) hours as needed for Pain., Disp: 180 tablet, Rfl: 0    amLODIPine (NORVASC) 10 MG tablet, Take 1 tablet (10 mg total) by mouth once daily., Disp: 30 tablet, Rfl: 1    aspirin 81 MG Chew, Take 81 mg by mouth once daily., Disp: , Rfl:     diclofenac sodium (VOLTAREN) 1 % Gel, Apply 4 g topically once daily. To both knees, Disp: 450 g, Rfl: 0    XIIDRA 5 % Dpet, Place 1 drop into both eyes 2 (two) times daily., Disp: , Rfl:      Review of Systems   Constitutional:  Negative for chills and fever.   Respiratory:  Negative for shortness of breath.    Cardiovascular:  Negative for chest pain and leg swelling.      Objective:   Vitals:    07/19/23 1118   BP: (!) 143/83   BP Location: Left arm   Patient Position: Sitting   BP Method: X-Large  "(Automatic)   Pulse: 65   Resp: 20   Temp: 98.2 °F (36.8 °C)   SpO2: 100%   Weight: 95.9 kg (211 lb 6.7 oz)   Height: 5' 2.01" (1.575 m)        Physical Exam  Constitutional:       General: She is not in acute distress.     Appearance: She is obese. She is not toxic-appearing.   Eyes:      Conjunctiva/sclera: Conjunctivae normal.   Cardiovascular:      Rate and Rhythm: Normal rate and regular rhythm.      Heart sounds: No murmur heard.    No friction rub. No gallop.   Pulmonary:      Effort: Pulmonary effort is normal. No respiratory distress.      Breath sounds: Normal breath sounds. No wheezing or rales.   Abdominal:      General: Abdomen is flat. Bowel sounds are normal. There is no distension.      Palpations: Abdomen is soft.      Tenderness: There is no abdominal tenderness.   Musculoskeletal:      Right lower leg: No edema.      Left lower leg: No edema.   Skin:     General: Skin is warm.   Neurological:      General: No focal deficit present.      Mental Status: She is alert and oriented to person, place, and time.        Geriatric Assessment:     Activities of Daily Living (ADLs):  Walking independent  Transferring independent  Feeding independent  Bathing independent  Toileting independent  Dressing/Grooming independent     Instrumental Activities of Daily Living (IADLs):  Finances: Needs assistance per    Transportation fully dependent  Cooking independent  Cleaning/Laundry independent  Shopping independent  Telephone / Communication independent  Medications independent     Cognitive Assessment:  SLUMS performed date: 3/29/23 and scanned to patient's chart in media, Score 11/30    Depression Assessment:   Depression Patient Health Questionnaire 7/3/2023 3/29/2023 3/29/2023 12/8/2022   Over the last two weeks how often have you been bothered by little interest or pleasure in doing things Not at all Not at all Not at all Not at all   Over the last two weeks how often have you been bothered by feeling " down, depressed or hopeless Not at all Not at all Not at all Not at all   PHQ-2 Total Score 0 0 0 0   Over the last two weeks how often have you been bothered by trouble falling or staying asleep, or sleeping too much - Not at all - -   Over the last two weeks how often have you been bothered by feeling tired or having little energy - Nearly every day - -   Over the last two weeks how often have you been bothered by a poor appetite or overeating - Not at all - -   Over the last two weeks how often have you been bothered by feeling bad about yourself - or that you are a failure or have let yourself or your family down - Not at all - -   Over the last two weeks how often have you been bothered by trouble concentrating on things, such as reading the newspaper or watching television - Nearly every day - -   Over the last two weeks how often have you been bothered by moving or speaking so slowly that other people could have noticed. Or the opposite - being so fidgety or restless that you have been moving around a lot more than usual. - Several days - -   Over the last two weeks how often have you been bothered by thoughts that you would be better off dead, or of hurting yourself - Not at all - -   If you checked off any problems, how difficult have these problems made it for you to do your work, take care of things at home or get along with other people? - Very difficult - -   Total Score - 7 - -   Interpretation - Mild - -     Recent labs:  CBC:  Lab Results   Component Value Date    WBC 5.47 07/03/2023    RBC 5.02 07/03/2023    HGB 14.8 07/03/2023    HCT 44.9 07/03/2023    MCV 89.4 07/03/2023    MCH 29.5 07/03/2023    MCHC 33.0 07/03/2023    RDW 13.8 07/03/2023     07/03/2023    MPV 10.0 07/03/2023      CMP:  Sodium Level   Date Value Ref Range Status   07/03/2023 141 136 - 145 mmol/L Final     Potassium Level   Date Value Ref Range Status   07/03/2023 3.7 3.5 - 5.1 mmol/L Final     Carbon Dioxide   Date Value  Ref Range Status   07/03/2023 22 (L) 23 - 31 mmol/L Final     Blood Urea Nitrogen   Date Value Ref Range Status   07/03/2023 17.1 9.8 - 20.1 mg/dL Final     Creatinine   Date Value Ref Range Status   07/03/2023 0.99 0.55 - 1.02 mg/dL Final     Calcium Level Total   Date Value Ref Range Status   07/03/2023 9.5 8.4 - 10.2 mg/dL Final     Albumin Level   Date Value Ref Range Status   07/03/2023 3.7 3.4 - 4.8 g/dL Final     Bilirubin Total   Date Value Ref Range Status   07/03/2023 0.4 <=1.5 mg/dL Final     Alkaline Phosphatase   Date Value Ref Range Status   07/03/2023 74 40 - 150 unit/L Final     Aspartate Aminotransferase   Date Value Ref Range Status   07/03/2023 23 5 - 34 unit/L Final     Alanine Aminotransferase   Date Value Ref Range Status   07/03/2023 11 0 - 55 unit/L Final     Estimated GFR-Non    Date Value Ref Range Status   07/06/2021 60 (L) >>=90 mL/min/1.73 m2 Final      BMP:  Lab Results   Component Value Date     07/03/2023    K 3.7 07/03/2023    CO2 22 (L) 07/03/2023    BUN 17.1 07/03/2023    CREATININE 0.99 07/03/2023    CALCIUM 9.5 07/03/2023    EGFRNONAA 60 (L) 07/06/2021      Lipid Panel:  Lab Results   Component Value Date    CHOL 232 (H) 07/03/2023    CHOL 211 (H) 12/09/2022    CHOL 219 (H) 07/06/2021     Lab Results   Component Value Date    HDL 68 (H) 07/03/2023    HDL 61 (H) 12/09/2022    HDL 63 (H) 07/06/2021     No results found for: LDLCALC  Lab Results   Component Value Date    TRIG 92 07/03/2023    TRIG 89 12/09/2022    TRIG 119 07/06/2021     No results found for: CHOLHDL   HbA1c:  Lab Results   Component Value Date    HGBA1C 5.3 12/09/2022      TSH:  Lab Results   Component Value Date    TSH 0.699 07/03/2023       Recent Imaging:  X-Ray Knee Complete 4 Or More Views Right  Narrative: EXAMINATION:  XR KNEE COMP 4 OR MORE VIEWS RIGHT    CLINICAL HISTORY:  Unilateral primary osteoarthritis, right knee    COMPARISON:  None.    FINDINGS:  No acute displaced fractures or  dislocations.    There is some most complete obliteration of the medial compartment of the knee joint with degenerative changes of the lateral compartment and the patellofemoral joint articular spaces otherwise preserved    No blastic or lytic lesions.    Cartilage calcification identified related to the presence of chondrocalcinosis  Impression: Tricompartmental degenerative changes.    Changes of chondrocalcinosis.    Electronically signed by: Chu Gonzalez  Date:    07/13/2023  Time:    13:44       Assessment & Plan:    Shelby Larios is presenting as above and will be treated as follows:    There are no diagnoses linked to this encounter.     1. Primary hypertension  - BP improved but still slightly above goal   - Continue Norvasc 10mg   - Will add lisinopril 10mg, allergies reviewed. Discussed potential SE   - Counseled on home BP log     2. Hyperlipidemia, unspecified hyperlipidemia type  - Elevated lipid panel   - ASCVD 18%   - Discussed risk vs benefit of statin therapy   - Start atorvastatin 20mg     3. VIC (obstructive sleep apnea)  - Significant daytime tiredness and snoring   - Reports she is willing to use CPAP if will help overall health   - Likely contributing to memory impairment   - Referral to sleep for CPAP re-titration     4. Mild cognitive impairment with memory loss  - Not significantly impairing function   - Discussed pharmacotherapy and will hold off at this time   - Labs reviewed   - Continue to monitor and can return to [facility] as needed for any new issues or worsening     RTC 2-4 weeks with PCP for HTN follow up and repeat BMP

## 2023-08-21 RX ORDER — ATORVASTATIN CALCIUM 20 MG/1
20 TABLET, FILM COATED ORAL DAILY
Qty: 90 TABLET | Refills: 1 | Status: SHIPPED | OUTPATIENT
Start: 2023-08-21 | End: 2024-02-23 | Stop reason: SDUPTHER

## 2023-08-21 RX ORDER — LISINOPRIL 10 MG/1
10 TABLET ORAL DAILY
Qty: 90 TABLET | Refills: 1 | Status: SHIPPED | OUTPATIENT
Start: 2023-08-21 | End: 2023-11-28 | Stop reason: SDUPTHER

## 2023-09-07 ENCOUNTER — HOSPITAL ENCOUNTER (EMERGENCY)
Facility: HOSPITAL | Age: 73
Discharge: HOME | End: 2023-09-07
Attending: EMERGENCY MEDICINE
Payer: COMMERCIAL

## 2023-09-07 VITALS
BODY MASS INDEX: 38.67 KG/M2 | SYSTOLIC BLOOD PRESSURE: 185 MMHG | RESPIRATION RATE: 18 BRPM | TEMPERATURE: 98 F | OXYGEN SATURATION: 100 % | DIASTOLIC BLOOD PRESSURE: 100 MMHG | WEIGHT: 210.13 LBS | HEART RATE: 50 BPM | HEIGHT: 62 IN

## 2023-09-07 DIAGNOSIS — M54.6 THORACIC BACK PAIN: ICD-10-CM

## 2023-09-07 DIAGNOSIS — I10 HYPERTENSION: ICD-10-CM

## 2023-09-07 LAB
ALBUMIN SERPL-MCNC: 3.3 G/DL (ref 3.4–4.8)
ALBUMIN/GLOB SERPL: 1 RATIO (ref 1.1–2)
ALP SERPL-CCNC: 68 UNIT/L (ref 40–150)
ALT SERPL-CCNC: 13 UNIT/L (ref 0–55)
AST SERPL-CCNC: 25 UNIT/L (ref 5–34)
BASOPHILS # BLD AUTO: 0.04 X10(3)/MCL
BASOPHILS NFR BLD AUTO: 0.7 %
BILIRUB SERPL-MCNC: 0.7 MG/DL
BUN SERPL-MCNC: 15.9 MG/DL (ref 9.8–20.1)
CALCIUM SERPL-MCNC: 9.1 MG/DL (ref 8.4–10.2)
CHLORIDE SERPL-SCNC: 109 MMOL/L (ref 98–107)
CO2 SERPL-SCNC: 23 MMOL/L (ref 23–31)
CREAT SERPL-MCNC: 1.04 MG/DL (ref 0.55–1.02)
EOSINOPHIL # BLD AUTO: 0.06 X10(3)/MCL (ref 0–0.9)
EOSINOPHIL NFR BLD AUTO: 1.1 %
ERYTHROCYTE [DISTWIDTH] IN BLOOD BY AUTOMATED COUNT: 14.6 % (ref 11.5–17)
GFR SERPLBLD CREATININE-BSD FMLA CKD-EPI: 57 MLS/MIN/1.73/M2
GLOBULIN SER-MCNC: 3.4 GM/DL (ref 2.4–3.5)
GLUCOSE SERPL-MCNC: 90 MG/DL (ref 82–115)
HCT VFR BLD AUTO: 40.1 % (ref 37–47)
HGB BLD-MCNC: 12.8 G/DL (ref 12–16)
IMM GRANULOCYTES # BLD AUTO: 0.01 X10(3)/MCL (ref 0–0.04)
IMM GRANULOCYTES NFR BLD AUTO: 0.2 %
LIPASE SERPL-CCNC: 29 U/L
LYMPHOCYTES # BLD AUTO: 2.59 X10(3)/MCL (ref 0.6–4.6)
LYMPHOCYTES NFR BLD AUTO: 46.6 %
MAGNESIUM SERPL-MCNC: 2.1 MG/DL (ref 1.6–2.6)
MCH RBC QN AUTO: 29.1 PG (ref 27–31)
MCHC RBC AUTO-ENTMCNC: 31.9 G/DL (ref 33–36)
MCV RBC AUTO: 91.1 FL (ref 80–94)
MONOCYTES # BLD AUTO: 0.4 X10(3)/MCL (ref 0.1–1.3)
MONOCYTES NFR BLD AUTO: 7.2 %
NEUTROPHILS # BLD AUTO: 2.46 X10(3)/MCL (ref 2.1–9.2)
NEUTROPHILS NFR BLD AUTO: 44.2 %
NRBC BLD AUTO-RTO: 0 %
PLATELET # BLD AUTO: 218 X10(3)/MCL (ref 130–400)
PMV BLD AUTO: 9.5 FL (ref 7.4–10.4)
POTASSIUM SERPL-SCNC: 3.8 MMOL/L (ref 3.5–5.1)
PROT SERPL-MCNC: 6.7 GM/DL (ref 5.8–7.6)
RBC # BLD AUTO: 4.4 X10(6)/MCL (ref 4.2–5.4)
SODIUM SERPL-SCNC: 141 MMOL/L (ref 136–145)
TROPONIN I SERPL-MCNC: <0.01 NG/ML (ref 0–0.04)
WBC # SPEC AUTO: 5.56 X10(3)/MCL (ref 4.5–11.5)

## 2023-09-07 PROCEDURE — 93005 ELECTROCARDIOGRAM TRACING: CPT

## 2023-09-07 PROCEDURE — 83690 ASSAY OF LIPASE: CPT | Performed by: EMERGENCY MEDICINE

## 2023-09-07 PROCEDURE — 63600175 PHARM REV CODE 636 W HCPCS: Performed by: EMERGENCY MEDICINE

## 2023-09-07 PROCEDURE — 25000003 PHARM REV CODE 250: Performed by: EMERGENCY MEDICINE

## 2023-09-07 PROCEDURE — 96372 THER/PROPH/DIAG INJ SC/IM: CPT | Performed by: EMERGENCY MEDICINE

## 2023-09-07 PROCEDURE — 85025 COMPLETE CBC W/AUTO DIFF WBC: CPT | Performed by: EMERGENCY MEDICINE

## 2023-09-07 PROCEDURE — 83735 ASSAY OF MAGNESIUM: CPT | Performed by: EMERGENCY MEDICINE

## 2023-09-07 PROCEDURE — 99285 EMERGENCY DEPT VISIT HI MDM: CPT | Mod: 25

## 2023-09-07 PROCEDURE — 84484 ASSAY OF TROPONIN QUANT: CPT | Performed by: EMERGENCY MEDICINE

## 2023-09-07 PROCEDURE — 80053 COMPREHEN METABOLIC PANEL: CPT | Performed by: EMERGENCY MEDICINE

## 2023-09-07 RX ORDER — KETOROLAC TROMETHAMINE 30 MG/ML
15 INJECTION, SOLUTION INTRAMUSCULAR; INTRAVENOUS
Status: COMPLETED | OUTPATIENT
Start: 2023-09-07 | End: 2023-09-07

## 2023-09-07 RX ORDER — CYCLOBENZAPRINE HCL 5 MG
5 TABLET ORAL
Status: COMPLETED | OUTPATIENT
Start: 2023-09-07 | End: 2023-09-07

## 2023-09-07 RX ORDER — CYCLOBENZAPRINE HCL 10 MG
10 TABLET ORAL 3 TIMES DAILY PRN
Qty: 15 TABLET | Refills: 0 | Status: SHIPPED | OUTPATIENT
Start: 2023-09-07 | End: 2023-09-12

## 2023-09-07 RX ADMIN — CYCLOBENZAPRINE HYDROCHLORIDE 5 MG: 5 TABLET, FILM COATED ORAL at 06:09

## 2023-09-07 RX ADMIN — CYCLOBENZAPRINE HYDROCHLORIDE 5 MG: 5 TABLET, FILM COATED ORAL at 04:09

## 2023-09-07 RX ADMIN — KETOROLAC TROMETHAMINE 15 MG: 30 INJECTION, SOLUTION INTRAMUSCULAR; INTRAVENOUS at 04:09

## 2023-09-07 NOTE — ED PROVIDER NOTES
"ED PROVIDER NOTE  9/7/2023    CHIEF COMPLAINT:   Chief Complaint   Patient presents with    Back Pain     States upper back pain "from shoulder to shoulder" x 3 days.  Denies injury.         HISTORY OF PRESENT ILLNESS:   Shelby Larios is a 72 y.o. female who presents with chief complaint Upper back pain.  Onset was 3 days ago whenever she began having constant pain in her upper back worse on the left side aggravated with movement of her left arm especially reaching across her body with her left arm.  States she did not have any trauma or injury.  Reports she is had this in the past and it seemed to go away on its own.  States she is not taken any medications for this at home, no ibuprofen or Tylenol or any over-the-counter analgesics.  Denies chest pain, shortness of breath, fever, chills, numbness or tingling in extremities, abdominal pain, nausea, vomiting.    The history is provided by the patient.         REVIEW OF SYSTEMS: as noted in the HPI.  NURSING NOTES REVIEWED      PAST MEDICAL/SURGICAL HISTORY:   Past Medical History:   Diagnosis Date    Hypertension     Personal history of colonic polyps 12/12/2017      Past Surgical History:   Procedure Laterality Date    COLONOSCOPY W/ POLYPECTOMY  12/12/2017    Dr. Jared Nesbitt    TUBAL LIGATION         FAMILY HISTORY:   Family History   Family history unknown: Yes       SOCIAL HISTORY:   Social History     Tobacco Use    Smoking status: Never    Smokeless tobacco: Never   Substance Use Topics    Alcohol use: Yes     Alcohol/week: 10.0 standard drinks of alcohol     Types: 10 Cans of beer per week     Comment: weekends    Drug use: Never       ALLERGIES: Review of patient's allergies indicates:  No Known Allergies    PHYSICAL EXAM:  Initial Vitals [09/07/23 0339]   BP Pulse Resp Temp SpO2   (!) 206/94 60 18 98.1 °F (36.7 °C) 98 %      MAP       --         Physical Exam    Nursing note and vitals reviewed.  Constitutional: She appears well-developed and " well-nourished.   HENT:   Head: Normocephalic and atraumatic.   Mouth/Throat: Uvula is midline and mucous membranes are normal.   Eyes: EOM are normal. Pupils are equal, round, and reactive to light.   Neck: Trachea normal. Neck supple.   Cardiovascular:  Normal rate, regular rhythm and normal pulses.           Pulmonary/Chest: Effort normal and breath sounds normal.   Abdominal: Abdomen is soft. Bowel sounds are normal. There is no rebound and no guarding.   Musculoskeletal:      Cervical back: Neck supple. No bony tenderness.      Thoracic back: Spasms and tenderness present. No bony tenderness.        Back:      Neurological: She is alert and oriented to person, place, and time. GCS eye subscore is 4. GCS verbal subscore is 5. GCS motor subscore is 6.   Skin: Skin is warm and dry.   Psychiatric: She has a normal mood and affect. Her speech is normal. Thought content normal.         RESULTS:  Labs Reviewed   COMPREHENSIVE METABOLIC PANEL - Abnormal; Notable for the following components:       Result Value    Chloride 109 (*)     Creatinine 1.04 (*)     Albumin Level 3.3 (*)     Albumin/Globulin Ratio 1.0 (*)     All other components within normal limits   CBC WITH DIFFERENTIAL - Abnormal; Notable for the following components:    MCHC 31.9 (*)     All other components within normal limits   LIPASE - Normal   MAGNESIUM - Normal   TROPONIN I - Normal   CBC W/ AUTO DIFFERENTIAL    Narrative:     The following orders were created for panel order CBC auto differential.  Procedure                               Abnormality         Status                     ---------                               -----------         ------                     CBC with Differential[[id number]]       Abnormal            Final result                 Please view results for these tests on the individual orders.     Imaging Results              X-Ray Chest PA And Lateral (Preliminary result)  Result time 09/07/23 04:23:17      Wet Read by  Boyd Muhammad DO (09/07/23 04:23:17, [facility], Emergency Medicine)    Normal cardiomediastinal silhouette.  No dense lobar consolidation or pneumothorax.                                    PROCEDURES:  Procedures    ECG:  EKG Readings: (Independently Interpreted)   Initial Reading: No STEMI. Rhythm: Normal Sinus Rhythm. Heart Rate: 62. Conduction: Normal. Axis: Left Axis Deviation.       ED COURSE AND MEDICAL DECISION MAKING:  Medications   ketorolac injection 15 mg (15 mg Intramuscular Given 9/7/23 0436)   cyclobenzaprine tablet 5 mg (5 mg Oral Given 9/7/23 0428)   cyclobenzaprine tablet 5 mg (5 mg Oral Given 9/7/23 0619)     ED Course as of 09/07/23 0651   Thu Sep 07, 2023   0459 WBC: 5.56 [IB]   0459 Hemoglobin: 12.8 [IB]   0459 Hematocrit: 40.1 [IB]   0635 Creatinine(!): 1.04  Not significantly changed from previous labs. [IB]   0638 Reports back pain is improved. [IB]      ED Course User Index  [IB] Boyd Muhammad DO        Medical Decision Making  Well-appearing 72-year-old female who presents with upper back pain that has been constant for the past 3 days.  She has tenderness to palpation of the left upper thoracic paraspinal region with palpable spasm.  Blood pressure is elevated, but she denies having any chest pain or shortness of breath.  She is no focal neurologic deficit on examination.  I suspect this is just a muscle spasm but given her elevated blood pressure and age I felt it best to check labs and get chest x-ray along with EKG to rule out any acute pathology.  ECG shows no STEMI.  Troponin normal.  CBC shows no leukocytosis or leukopenia, hemoglobin 12.8.  CMP shows no significant electrolyte abnormalities with a creatinine 1.04 that is not significantly changed from previous labs.  Lipase normal.  Magnesium normal.  Chest x-ray shows no dense lobar consolidation or pneumothorax.  She reported improvement in her pain after given a total of 10 mg of Flexeril and seemed  to tolerate it well so we will discharge her with Flexeril and discussed therapies to continue at home such as heating pads and Lidoderm patch.  Recommend that she keep an eye on her blood pressure as it has improved some to 185/100, she states she will take her blood pressure medication when she gets home and instructed to follow up with her PCP.  Given strict ED return precautions. I have spoken with the patient and/or caregivers. I have explained the patient's condition, diagnoses and treatment plan based on the information available to me at this time. I have answered the patient's and/or caregiver's questions and addressed any concerns. The patient and/or caregivers have as good an understanding of the patient's diagnosis, condition and treatment plan as can be expected at this point. The vital signs have been stable. The patient's condition is stable and appropriate for discharge from the emergency department.     The patient will pursue further outpatient evaluation with the primary care physician or other designated or consulting physician as outlined in the discharge instructions. The patient and/or caregivers are agreeable to this plan of care and follow-up instructions have been explained in detail. The patient and/or caregivers have received these instructions in written format and have expressed an understanding of the discharge instructions. The patient and/or caregivers are aware that any significant change in condition or worsening of symptoms should prompt an immediate return to this or the closest emergency department or a call to 911.    Amount and/or Complexity of Data Reviewed  External Data Reviewed: labs.  Labs: ordered. Decision-making details documented in ED Course.  Radiology: ordered and independent interpretation performed.  ECG/medicine tests: ordered and independent interpretation performed.    Risk  OTC drugs.  Prescription drug management.        CLINICAL IMPRESSION:  1. Hypertension     2. Thoracic back pain        DISPOSITION:   ED Disposition Condition    Discharge Stable            ED Prescriptions       Medication Sig Dispense Start Date End Date Auth. Provider    cyclobenzaprine (FLEXERIL) 10 MG tablet Take 1 tablet (10 mg total) by mouth 3 (three) times daily as needed for Muscle spasms. 15 tablet 9/7/2023 9/12/2023 Boyd Muhammad DO          Follow-up Information       Follow up With Specialties Details Why Contact Info    Elicia Schmidt FNP Nurse Practitioner Schedule an appointment as soon as possible for a visit   [facility]  Internal Medicine Clinic  [facility] [address]  [phone]      [facility] Emergency Medicine  If symptoms worsen [facility] [id number] [phone]               Boyd Muhammad DO  09/07/23 0651

## 2023-09-27 ENCOUNTER — HOSPITAL ENCOUNTER (EMERGENCY)
Facility: HOSPITAL | Age: 73
Discharge: HOME | End: 2023-09-27
Attending: INTERNAL MEDICINE
Payer: COMMERCIAL

## 2023-09-27 VITALS
HEART RATE: 72 BPM | SYSTOLIC BLOOD PRESSURE: 165 MMHG | TEMPERATURE: 98 F | HEIGHT: 62 IN | RESPIRATION RATE: 20 BRPM | WEIGHT: 211.63 LBS | OXYGEN SATURATION: 100 % | BODY MASS INDEX: 38.94 KG/M2 | DIASTOLIC BLOOD PRESSURE: 117 MMHG
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
## 2023-09-27 DIAGNOSIS — M25.561 RIGHT ANTERIOR KNEE PAIN: Primary | ICD-10-CM

## 2023-09-27 PROCEDURE — 99283 EMERGENCY DEPT VISIT LOW MDM: CPT

## 2023-09-27 RX ORDER — MELOXICAM 7.5 MG/1
7.5 TABLET ORAL DAILY
Qty: 20 TABLET | Refills: 0 | Status: SHIPPED | OUTPATIENT
Start: 2023-09-27 | End: 2023-11-08 | Stop reason: SDUPTHER

## 2023-09-27 NOTE — ED PROVIDER NOTES
Encounter Date: 9/27/2023       History     Chief Complaint   Patient presents with    Fall     C/o fall Sunday night. States tripped over rug. Hit right knee. C'o pain     72 YO AAF in ER with complaints of right knee pain after a fall at home on Sunday. States she tripped over a rug and fell landing on her side. Denies head injury or LOC. Not on anticoagulants. PMHx of HTN. Did not take her meds today though and denies HA, blurred vision, dizziness, chest pain or SOB. No other complaints. Has not taken anything for pain. Hx of knee OA and has been getting knee injections.     The history is provided by the patient.     Review of patient's allergies indicates:  No Known Allergies  Past Medical History:   Diagnosis Date    Hypertension     Personal history of colonic polyps 12/12/2017     Past Surgical History:   Procedure Laterality Date    COLONOSCOPY W/ POLYPECTOMY  12/12/2017    Dr. Jared Nesbitt    TUBAL LIGATION       Family History   Family history unknown: Yes     Social History     Tobacco Use    Smoking status: Never    Smokeless tobacco: Never   Substance Use Topics    Alcohol use: Yes     Alcohol/week: 10.0 standard drinks of alcohol     Types: 10 Cans of beer per week     Comment: weekends    Drug use: Never     Review of Systems   Constitutional:  Negative for chills and fever.   HENT:  Negative for congestion and sore throat.    Respiratory:  Negative for shortness of breath.    Cardiovascular:  Negative for chest pain.   Gastrointestinal:  Negative for abdominal pain, diarrhea, nausea and vomiting.   Genitourinary:  Negative for dysuria.   Musculoskeletal:  Positive for arthralgias. Negative for back pain, joint swelling and myalgias.   Skin:  Negative for rash.   Neurological:  Negative for dizziness, weakness, light-headedness and headaches.   Hematological:  Does not bruise/bleed easily.   All other systems reviewed and are negative.      Physical Exam     Initial Vitals [09/27/23 0915]   BP  Pulse Resp Temp SpO2   (!) 165/117 72 20 97.7 °F (36.5 °C) 100 %      MAP       --         Physical Exam    Nursing note and vitals reviewed.  Constitutional: She appears well-developed and well-nourished. She is not diaphoretic. No distress.   HENT:   Head: Normocephalic and atraumatic.   Nose: Nose normal.   Eyes: Conjunctivae are normal.   Cardiovascular:  Normal rate, regular rhythm and intact distal pulses.           Pulmonary/Chest: Breath sounds normal.   Musculoskeletal:         General: Normal range of motion.     Neurological: She is alert and oriented to person, place, and time. She has normal strength.   Skin: Skin is warm and dry.   Psychiatric: She has a normal mood and affect.         ED Course   Procedures  Labs Reviewed - No data to display       Imaging Results              X-Ray Knee 3 View Right (Preliminary result)  Result time 09/27/23 10:36:15      Wet Read by Tian Donahue PA (09/27/23 10:36:15, [facility], Emergency Medicine)    Severe degenerative joint disease with spurring and vascular calcifications noted, no acute fractures or other bony abnormalities noted                                     Medications - No data to display  Medical Decision Making  Amount and/or Complexity of Data Reviewed  Radiology: ordered and independent interpretation performed.    Risk  Prescription drug management.               ED Course as of 09/27/23 1057   Wed Sep 27, 2023   1036 VSS, NAD, pt is non-toxic or ill appearing,  imaging reviewed with pt, treatment plan and discharge instructions including follow up discussed, pt verbalized understanding, all questions answered, pt is stable and ready for discharge  [TT]      ED Course User Index  [TT] Tian Donahue PA                    Clinical Impression:   Final diagnoses:  [W19.XXXA] Fall  [M25.561] Right anterior knee pain (Primary)        ED Disposition Condition    Discharge Stable          ED Prescriptions       Medication Sig  Dispense Start Date End Date Auth. Provider    meloxicam (MOBIC) 7.5 MG tablet Take 1 tablet (7.5 mg total) by mouth once daily. 20 tablet 9/27/2023 -- Tian Donahue PA          Follow-up Information       Follow up With Specialties Details Why Contact Info    Elicia Schmidt FNP Nurse Practitioner Schedule an appointment as soon as possible for a visit in 3 days  [address] [facility]  Internal Medicine Clinic  [facility] [id number]  [phone]      [facility] Emergency Medicine In 3 days As needed, If symptoms worsen [facility] [id number] [phone]             Tian Donahue PA  09/27/23 1056

## 2023-09-27 NOTE — DISCHARGE INSTRUCTIONS
Take all medications as prescribed.     Drink plenty of fluids and get a lot of rest.     Use heat or ice to knee to help with pain.    Follow up with your PCP and orthopedist for your knee injections.     Return to ER for any changes or worsening of symptoms.

## 2023-10-13 ENCOUNTER — OFFICE VISIT (OUTPATIENT)
Dept: ORTHOPEDICS | Facility: CLINIC | Age: 73
End: 2023-10-13
Payer: COMMERCIAL

## 2023-10-13 VITALS
DIASTOLIC BLOOD PRESSURE: 99 MMHG | BODY MASS INDEX: 39.38 KG/M2 | HEIGHT: 62 IN | WEIGHT: 214 LBS | HEART RATE: 74 BPM | SYSTOLIC BLOOD PRESSURE: 176 MMHG

## 2023-10-13 DIAGNOSIS — I16.0 ASYMPTOMATIC HYPERTENSIVE URGENCY: ICD-10-CM

## 2023-10-13 DIAGNOSIS — E66.9 OBESITY (BMI 30-39.9): ICD-10-CM

## 2023-10-13 DIAGNOSIS — M17.11 PRIMARY OSTEOARTHRITIS OF RIGHT KNEE: Primary | ICD-10-CM

## 2023-10-13 PROCEDURE — 99213 OFFICE O/P EST LOW 20 MIN: CPT | Mod: PBBFAC | Performed by: STUDENT IN AN ORGANIZED HEALTH CARE EDUCATION/TRAINING PROGRAM

## 2023-10-13 NOTE — PROGRESS NOTES
Patient's /99 with a pulse of 74, I asked her if she had taken her BP medication and she admitted she has not today.Asked patient if she is experiencing any chest pain or dizziness, etc. She admits that she is experiencing a little light headedness. When rechecked her BP it was 167/96 with a pulse of 67. Will speak with Dr Carrasquillo about patients BP and see what he advises. Advised patient about the importance of taking her BP medication every day and on time. She verbalized understanding.

## 2023-10-13 NOTE — PROGRESS NOTES
"Subjective:    Patient ID: Shelby Larios is a 73 y.o. female  who presented to [facility] for follow up..      Chief Complaint: Pain of the Right Knee      History of Present Illness:  HPI    Shelby Larios who has a history of KL grade 4 knee oa  presented today with some knee pain. She was seen previously in 7/2023 and receieved a knee CSI without complication and with relief up until recently in the past few days. She has had braces and sleeves and doesn't wear. She is functionally physically active. Doesn't want formal physical therapy. Doesn't want oral medications. She was interested in a CSI possibly.     Knee Review of Systems:  Swelling?  no  Instability?  no  Mechanical sx?  no  <30 min AM stiffness? no  Limited ROM? no  Fever/Chills? no    Current Choice of Exercise:  none    ROS       Objective:      Physical Exam:    BP (!) 176/99   Pulse 74   Ht 5' 2" (1.575 m)   Wt 97.1 kg (214 lb)   BMI 39.14 kg/m²     Ortho/SPM Exam    Appearance:  Normal gait/station  FWB  Alignment: Left: normal Right: normal   Soft tissue swelling: Left: no Right: no  Effusion: Left:  Negative Right: Negative  Erythema: Left no Right: no  Ecchymosis: Left: no Right: no  Atrophy: Left: no Right: no    General appearance: NAD    Labs:  Last A1c: 5.3     Imaging:   Previous images reviewed.  X-rays ordered and performed today: no  # of views: 3 Laterality: right  My Interpretation:  shows DJD changes, likely chronic and kl grade 4     Assessment:        Encounter Diagnoses   Code Name Primary?    M17.11 Primary osteoarthritis of right knee Yes    E66.9 Obesity (BMI 30-39.9)     I16.0 Asymptomatic hypertensive urgency         Plan:           No orders of the defined types were placed in this encounter.           Dx: right Knee Osteoarthritis. Well controlled. However her BP is elevated. She did not take meds today. She is overall asymptomatic and non-acute " appearing.  Treatment Plan: Discussed with patient diagnosis, prognosis, and treatment recommendations. Education provided.    topical hot or cold therapy  Over the counter NSAID and/or tylenol provided you do not have contraindications such as but not limited to liver or kidney disease or uncontrolled blood pressure. If you're doctors have told you to to not take them based on your health, do not take them.   oral glucosamine 1500 mg/day.  topical capsaicin as needed  Using a brace provided to you here or from your local pharmacy or durable medical equipment (DME) store.   We discussed RBA of CSI and we decided on holding off on injection and will return when her bp is normal and if her knee pain is worse. ER precautions. She does not want braces, oral or topical prescriptions, formal PT or HEP.  Imaging: prior radiological studies independently reviewed; discussed with patient; agree with radiologist interpretation.   Weight Management: is paramount. Recommend at least 10% of total body weight loss if your BMI is 30-34.9. A BMI of <24.9 may provide further relief..   Procedure: Discussed CSI/VSI as treatment options; discussed CSI vs VS injections as treatment options in future if conservative measures do not improve symptoms.  Activity: Activity as tolerated; HEP to include aerobic conditioning and strength training with non-painful activity. ROM/STG exercises. Proper footware; assistive devises to avoid limping.   Therapy: No formal therapy  Medication: CONTINUE over-the-counter acetaminophen (Tylenol 1000 mg three times per day as needed). Please see your primary care physician for further refills.  RTC: PRN; call if any issues.         Procedures

## 2023-10-25 ENCOUNTER — OFFICE VISIT (OUTPATIENT)
Dept: INTERNAL MEDICINE | Facility: CLINIC | Age: 73
End: 2023-10-25
Payer: COMMERCIAL

## 2023-10-25 VITALS
SYSTOLIC BLOOD PRESSURE: 170 MMHG | HEART RATE: 67 BPM | RESPIRATION RATE: 20 BRPM | HEIGHT: 62 IN | WEIGHT: 213.19 LBS | BODY MASS INDEX: 39.23 KG/M2 | DIASTOLIC BLOOD PRESSURE: 88 MMHG | TEMPERATURE: 98 F

## 2023-10-25 DIAGNOSIS — E78.2 MIXED HYPERLIPIDEMIA: ICD-10-CM

## 2023-10-25 DIAGNOSIS — Z78.0 POST-MENOPAUSAL: ICD-10-CM

## 2023-10-25 DIAGNOSIS — G47.33 OSA (OBSTRUCTIVE SLEEP APNEA): Chronic | ICD-10-CM

## 2023-10-25 DIAGNOSIS — E66.01 CLASS 2 SEVERE OBESITY DUE TO EXCESS CALORIES WITH SERIOUS COMORBIDITY AND BODY MASS INDEX (BMI) OF 38.0 TO 38.9 IN ADULT: ICD-10-CM

## 2023-10-25 DIAGNOSIS — I10 PRIMARY HYPERTENSION: Primary | Chronic | ICD-10-CM

## 2023-10-25 DIAGNOSIS — N18.31 STAGE 3A CHRONIC KIDNEY DISEASE: Chronic | ICD-10-CM

## 2023-10-25 PROBLEM — M10.9 GOUT: Chronic | Status: ACTIVE | Noted: 2022-11-18

## 2023-10-25 PROBLEM — M17.0 BILATERAL PRIMARY OSTEOARTHRITIS OF KNEE: Chronic | Status: ACTIVE | Noted: 2022-12-08

## 2023-10-25 PROBLEM — E66.812 CLASS 2 SEVERE OBESITY DUE TO EXCESS CALORIES WITH SERIOUS COMORBIDITY AND BODY MASS INDEX (BMI) OF 38.0 TO 38.9 IN ADULT: Chronic | Status: ACTIVE | Noted: 2022-12-08

## 2023-10-25 PROCEDURE — 1101F PR PT FALLS ASSESS DOC 0-1 FALLS W/OUT INJ PAST YR: ICD-10-PCS | Mod: CPTII,,, | Performed by: NURSE PRACTITIONER

## 2023-10-25 PROCEDURE — 3079F PR MOST RECENT DIASTOLIC BLOOD PRESSURE 80-89 MM HG: ICD-10-PCS | Mod: CPTII,,, | Performed by: NURSE PRACTITIONER

## 2023-10-25 PROCEDURE — 4010F ACE/ARB THERAPY RXD/TAKEN: CPT | Mod: CPTII,,, | Performed by: NURSE PRACTITIONER

## 2023-10-25 PROCEDURE — 3079F DIAST BP 80-89 MM HG: CPT | Mod: CPTII,,, | Performed by: NURSE PRACTITIONER

## 2023-10-25 PROCEDURE — 4010F PR ACE/ARB THEARPY RXD/TAKEN: ICD-10-PCS | Mod: CPTII,,, | Performed by: NURSE PRACTITIONER

## 2023-10-25 PROCEDURE — 1126F PR PAIN SEVERITY QUANTIFIED, NO PAIN PRESENT: ICD-10-PCS | Mod: CPTII,,, | Performed by: NURSE PRACTITIONER

## 2023-10-25 PROCEDURE — 1159F MED LIST DOCD IN RCRD: CPT | Mod: CPTII,,, | Performed by: NURSE PRACTITIONER

## 2023-10-25 PROCEDURE — 1160F PR REVIEW ALL MEDS BY PRESCRIBER/CLIN PHARMACIST DOCUMENTED: ICD-10-PCS | Mod: CPTII,,, | Performed by: NURSE PRACTITIONER

## 2023-10-25 PROCEDURE — 99214 PR OFFICE/OUTPT VISIT, EST, LEVL IV, 30-39 MIN: ICD-10-PCS | Mod: S$PBB,,, | Performed by: NURSE PRACTITIONER

## 2023-10-25 PROCEDURE — 1126F AMNT PAIN NOTED NONE PRSNT: CPT | Mod: CPTII,,, | Performed by: NURSE PRACTITIONER

## 2023-10-25 PROCEDURE — 99214 OFFICE O/P EST MOD 30 MIN: CPT | Mod: S$PBB,,, | Performed by: NURSE PRACTITIONER

## 2023-10-25 PROCEDURE — 1160F RVW MEDS BY RX/DR IN RCRD: CPT | Mod: CPTII,,, | Performed by: NURSE PRACTITIONER

## 2023-10-25 PROCEDURE — 1159F PR MEDICATION LIST DOCUMENTED IN MEDICAL RECORD: ICD-10-PCS | Mod: CPTII,,, | Performed by: NURSE PRACTITIONER

## 2023-10-25 PROCEDURE — 3008F PR BODY MASS INDEX (BMI) DOCUMENTED: ICD-10-PCS | Mod: CPTII,,, | Performed by: NURSE PRACTITIONER

## 2023-10-25 PROCEDURE — 3008F BODY MASS INDEX DOCD: CPT | Mod: CPTII,,, | Performed by: NURSE PRACTITIONER

## 2023-10-25 PROCEDURE — 99214 OFFICE O/P EST MOD 30 MIN: CPT | Mod: PBBFAC | Performed by: NURSE PRACTITIONER

## 2023-10-25 PROCEDURE — 3288F PR FALLS RISK ASSESSMENT DOCUMENTED: ICD-10-PCS | Mod: CPTII,,, | Performed by: NURSE PRACTITIONER

## 2023-10-25 PROCEDURE — 3077F SYST BP >= 140 MM HG: CPT | Mod: CPTII,,, | Performed by: NURSE PRACTITIONER

## 2023-10-25 PROCEDURE — 1101F PT FALLS ASSESS-DOCD LE1/YR: CPT | Mod: CPTII,,, | Performed by: NURSE PRACTITIONER

## 2023-10-25 PROCEDURE — 3077F PR MOST RECENT SYSTOLIC BLOOD PRESSURE >= 140 MM HG: ICD-10-PCS | Mod: CPTII,,, | Performed by: NURSE PRACTITIONER

## 2023-10-25 PROCEDURE — 3288F FALL RISK ASSESSMENT DOCD: CPT | Mod: CPTII,,, | Performed by: NURSE PRACTITIONER

## 2023-10-25 RX ORDER — AMLODIPINE BESYLATE 10 MG/1
10 TABLET ORAL NIGHTLY
Qty: 90 TABLET | Refills: 1 | Status: SHIPPED | OUTPATIENT
Start: 2023-10-25

## 2023-10-25 NOTE — PROGRESS NOTES
Elicia Schmidt, KAREL   [facility]  [address] [facility] [address]      PATIENT NAME: Shelby Larios  : 1950  DATE: 10/25/23  MRN: [id number]      Reason for Visit / Chief Complaint: Hypertension       History of Present Illness / Problem Focused Workflow     Shelby Larios is a 73 y.o. Black or  female presents to the clinic for HTN and HLD f/u. Followed by [facility] ortho clinic and geriatric clinic (return prn).    Since last visit, pt was evaluated per geriatric clinic and noted to have minimal memory loss. Pt's  present during visit today, continues to report pt has memory loss. He states pt asks him the same questions repeated during the day; especially what day of the week it is. Will get confused in house with rooms. 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 BP elevated at visit and med adjustments were made. Since that time, pt has reported to ED x 2, once for right knee pain after trip and fall and thoracic back pain. Denies any knee or back pain today. On both notes, pt's BP was elevated and she stated has not taken meds. Is out of norvasc; states took lisinopril this morning. Did not bring BP log to visit. Does not follow low sodium diet. Cologimer was unable to process. Has not resumed wearing CPAP at night.  Declines vaccines today. Denies chest pain, shortness of breath, cough, fever, headache, dizziness, weakness, abdominal pain, nausea, vomiting, diarrhea, constipation, dysuria, depression, anxiety, SI/HI.      Review of Systems     Review of Systems     See HPI for details    Constitutional: Denies Change in appetite. Denies Chills. Denies Fever. Denies Night sweats.  Eye: Denies Blurred vision. Denies Discharge. Denies Eye pain.  ENT: Denies Decreased hearing. Denies Sore throat. Denies Swollen glands.  Respiratory: Denies Cough. Denies Shortness of breath. Denies  Shortness of breath with exertion. Denies Wheezing.  Cardiovascular: Denies Chest pain at rest. Denies Chest pain with exertion. Denies Irregular heartbeat. Denies Palpitations. Denies Edema.  Gastrointestinal: Denies Abdominal pain. Denies Diarrhea. Denies Nausea. Denies Vomiting. Denies Hematemesis or Hematochezia.  Genitourinary: Denies Dysuria. Denies Urinary frequency. Denies Urinary urgency. Denies Blood in urine.  Endocrine: Denies Cold intolerance. Denies Excessive thirst. Denies Heat intolerance. Denies Weight loss. Denies Weight gain.  Musculoskeletal: Admits Painful joints. Denies Weakness.  Integumentary: Denies Rash. Denies Itching. Denies Dry skin.  Neurologic: Denies Dizziness. Denies Fainting. Denies Headache. Admits Memory loss/forgetfulness.  Psychiatric: Denies Depression. Denies Anxiety. Denies Suicidal/Homicidal ideations.     All Other ROS: Negative except as stated in HPI.     Medical / Surgical / Social / Family History       ----------------------------  Hypertension  Personal history of colonic polyps     Past Surgical History:   Procedure Laterality Date    COLONOSCOPY W/ POLYPECTOMY  12/12/2017    Dr. Jared Nesbitt    TUBAL LIGATION         Social History     Socioeconomic History    Marital status:      Spouse name: Александр    Number of children: 8   Occupational History    Occupation: retired   Tobacco Use    Smoking status: Never     Passive exposure: Never    Smokeless tobacco: Never   Substance and Sexual Activity    Alcohol use: Yes     Alcohol/week: 10.0 standard drinks of alcohol     Types: 10 Cans of beer per week     Comment: weekends    Drug use: Never    Sexual activity: Not Currently     Partners: Male     Social Determinants of Health     Financial Resource Strain: Low Risk  (7/19/2023)    Overall Financial Resource Strain (CARDIA)     Difficulty of Paying Living Expenses: Not hard at all   Food Insecurity: No Food Insecurity (7/19/2023)    Hunger Vital Sign      Worried About Running Out of Food in the Last Year: Never true     Ran Out of Food in the Last Year: Never true   Transportation Needs: No Transportation Needs (7/19/2023)    PRAPARE - Transportation     Lack of Transportation (Medical): No     Lack of Transportation (Non-Medical): No   Physical Activity: Insufficiently Active (7/19/2023)    Exercise Vital Sign     Days of Exercise per Week: 5 days     Minutes of Exercise per Session: 10 min   Stress: No Stress Concern Present (7/19/2023)    [employer] - Occupational Stress Questionnaire     Feeling of Stress : Only a little   Social Connections: Moderately Integrated (7/19/2023)    Social Connection and Isolation Panel [NHANES]     Frequency of Communication with Friends and Family: Twice a week     Frequency of Social Gatherings with Friends and Family: Once a week     Attends [employer] Services: More than 4 times per year     Active Member of Clubs or Organizations: No     Attends Club or Organization Meetings: Never     Marital Status:    Housing Stability: Low Risk  (12/8/2022)    Housing Stability Vital Sign     Unable to Pay for Housing in the Last Year: No     Number of Places Lived in the Last Year: 1     Unstable Housing in the Last Year: No        Family History   Family history unknown: Yes        Medications and Allergies     Medications  Current Outpatient Medications   Medication Instructions    acetaminophen (TYLENOL) 1,000 mg, Oral, Every 8 hours PRN    amLODIPine (NORVASC) 10 mg, Oral, Nightly    aspirin 81 mg, Oral, Daily    atorvastatin (LIPITOR) 20 mg, Oral, Daily    lisinopriL 10 mg, Oral, Daily    meloxicam (MOBIC) 7.5 mg, Oral, Daily    XIIDRA 5 % Dpet 1 drop, Both Eyes, 2 times daily         Allergies  Review of patient's allergies indicates:  No Known Allergies    Physical Examination     BP (!) 170/88 (BP Location: Right arm, Patient Position: Sitting, BP Method: Large (Manual))   Pulse 67   Temp 98.2  "°F (36.8 °C) (Oral)   Resp 20   Ht 5' 2.01" (1.575 m)   Wt 96.7 kg (213 lb 3.2 oz)   BMI 38.98 kg/m²     Physical Exam  Musculoskeletal:      Right knee: Crepitus present. Decreased range of motion.   Psychiatric:         Cognition and Memory: Memory is impaired.         General: Alert and oriented, No acute distress.  Head: Normocephalic, Atraumatic.  Eye: Pupils are equal, round and reactive to light, Extraocular movements are intact, Sclera non-icteric.  Ears/Nose/Throat: Normal, Mucosa moist,Clear.  Neck/Thyroid: Supple, Non-tender, No carotid bruit, No lymphadenopathy, No JVD, Full range of motion.  Respiratory: Clear to auscultation bilaterally; No wheezes, rales or rhonchi,Non-labored respirations, Symmetrical chest wall expansion.  Cardiovascular: Regular rate and rhythm, S1/S2 normal, No murmurs, rubs or gallops.  Gastrointestinal: Soft, Non-tender, Non-distended, Normal bowel sounds, No palpable organomegaly.  Integumentary: Warm, Dry, Intact, No suspicious lesions or rashes.  Extremities: No clubbing, cyanosis or edema  Neurologic: No focal deficits, Cranial Nerves II-XII are grossly intact, Motor strength normal upper and lower extremities, Sensory exam intact.  Psychiatric: Normal interaction, Coherent speech, Appropriate affect       Results     Lab Results   Component Value Date    WBC 5.56 09/07/2023    HGB 12.8 09/07/2023    HCT 40.1 09/07/2023     09/07/2023    CHOL 232 (H) 07/03/2023    TRIG 92 07/03/2023    ALT 13 09/07/2023    AST 25 09/07/2023     09/07/2023    K 3.8 09/07/2023    CREATININE 1.04 (H) 09/07/2023    BUN 15.9 09/07/2023    CO2 23 09/07/2023    TSH 0.699 07/03/2023    INR 0.99 09/27/2017    HGBA1C 5.3 12/09/2022         Assessment and Plan (including Health Maintenance)     Plan:     1. Primary hypertension  Elevated; pt asymptomatic.  Endorses dizziness at times.  Continue lisinopril.  Refilled amlodipine.  Follow a low sodium (less than 2 grams of sodium per " day), DASH diet.   Continue medications as prescribed.  Monitor blood pressure and report any consistent values greater than 140/90 and keep a log.  Maintain healthy weight with a BMI goal of <30.   Aerobic exercise for 150 minutes per week (or 5 days a week for 30 minutes each day).    - amLODIPine (NORVASC) 10 MG tablet; Take 1 tablet (10 mg total) by mouth every evening.  Dispense: 90 tablet; Refill: 1    2. Stage 3a chronic kidney disease  GFR 57.  Renoprotective measures discussed:  -Follow renal diet (reduce intake of nuts, peanut butter, milk, cheese, dried beans, peas) and Low Sodium Diet (less than 2 grams per day).  -Control high blood pressure ( goal BP < 130/80, please record BP at home every day and bring log to next office visit)  -Exercise at least 30 minutes a day, 5 days a week.  -Maintain healthy weight.  -Decrease or stop alcohol use.  -Do not smoke.  -Stay well hydrated.  -Receive Pneumovax, Flu, and HBV vaccines if indicated.  -Do not take NSAIDs (Ibuprofen, Naproxen, Aleve, Advil, Toradol, Mobic), may take only Tylenol as needed for pain/headaches.  -Take cholesterol-lowering medications as prescribed (LDL goal <100).      3. Class 2 severe obesity due to excess calories with serious comorbidity and body mass index (BMI) of 38.0 to 38.9 in adult  BMI 38. Goal BMI <30.  Aerobic exercise 150 minutes per week.  Avoid soda, simple sugars, sweets, excessive rice, pasta, potatoes or bread.   Choose brown options when available and portion control.  Limit fast foods and fried foods.   Choose complex carbs in moderation (ex: green, leafy vegetables, beans, oatmeal).  Eat plenty of fresh fruits and vegetables with lean meats daily.   Consider permanent healthy lifestyle changes.      4. VIC (obstructive sleep apnea)  Machine was returned over 6 months ago d/t noncompliance.   Pt is not willing to retest at this time.   Will continue to monitor.       5. Post-menopausal  - DXA Bone Density Axial Skeleton  1 or more sites; Future      Problem List Items Addressed This Visit          Cardiac/Vascular    Hypertension - Primary (Chronic)    Relevant Medications    amLODIPine (NORVASC) 10 MG tablet    Other Relevant Orders    Comprehensive Metabolic Panel    Microalbumin/Creatinine Ratio, Urine    Urinalysis, Reflex to Urine Culture       Renal/    Stage 3a chronic kidney disease (Chronic)    Relevant Orders    Comprehensive Metabolic Panel    Post-menopausal    Relevant Orders    DXA Bone Density Axial Skeleton 1 or more sites       Endocrine    Class 2 severe obesity due to excess calories with serious comorbidity and body mass index (BMI) of 38.0 to 38.9 in adult (Chronic)       Other    VIC (obstructive sleep apnea) (Chronic)     Other Visit Diagnoses       Mixed hyperlipidemia        Relevant Orders    Lipid Panel              Health Maintenance Due   Topic Date Due    DEXA Scan  Never done    Colorectal Cancer Screening  12/12/2022    COVID-19 Vaccine (4 - 2023-24 season) 09/01/2023    Shingles Vaccine (2 of 2) 10/24/2023       Follow up in about 4 weeks (around 11/22/2023) for Follow up, HTN, Lab Results, HLD.        Signature:  KAREL Harris  [facility]  [address] [facility] [id number]

## 2023-10-25 NOTE — PATIENT INSTRUCTIONS
Take atorvastatin and amlodipine at bedtime (night time).  Take aspirin and lisinopril in the morning.

## 2023-11-03 DIAGNOSIS — G47.33 OSA (OBSTRUCTIVE SLEEP APNEA): Primary | ICD-10-CM

## 2023-11-06 NOTE — PROGRESS NOTES
Date of Service: 7/3/2023    Attending Attestation: Patient discussed with resident. The chart was reviewed thoroughly including pertinent vitals, labs, imaging, medications, prior notes, and consultant/specialist recommendations.  I participated in the management of the patient, examined the patient, reviewed the summary of the plan, and was immediately available at all times throughout the encounter. Services were furnished in a primary care center located in the outpatient department of a [facility] hospital. I agree with the resident's findings and plan as documented in the resident's note.    Sharon Cortez MD  Attending - Family Medicine / Geriatric Medicine  [facility] [place], [facility]

## 2023-11-06 NOTE — PROGRESS NOTES
Date of Service: 7/3/2023    Attending Attestation: Patient discussed with resident. The chart was reviewed thoroughly including pertinent vitals, labs, imaging, medications, prior notes, and consultant/specialist recommendations.  I participated in the management of the patient, examined the patient, reviewed the summary of the plan, and was immediately available at all times throughout the encounter. Services were furnished in a primary care center located in the outpatient department of a [facility] hospital. I agree with the resident's findings and plan as documented in the resident's note.    Sharon Cortez MD  Attending - Family Medicine / Geriatric Medicine  [facility] [place], [facility]

## 2023-11-08 ENCOUNTER — HOSPITAL ENCOUNTER (EMERGENCY)
Facility: HOSPITAL | Age: 73
Discharge: HOME | End: 2023-11-08
Attending: EMERGENCY MEDICINE
Payer: COMMERCIAL

## 2023-11-08 VITALS
RESPIRATION RATE: 16 BRPM | BODY MASS INDEX: 39.02 KG/M2 | WEIGHT: 212.06 LBS | OXYGEN SATURATION: 98 % | HEART RATE: 81 BPM | TEMPERATURE: 98 F | HEIGHT: 62 IN | SYSTOLIC BLOOD PRESSURE: 132 MMHG | DIASTOLIC BLOOD PRESSURE: 89 MMHG

## 2023-11-08 DIAGNOSIS — M54.2 NECK PAIN: ICD-10-CM

## 2023-11-08 DIAGNOSIS — M67.912 DISORDER OF LEFT ROTATOR CUFF: Primary | ICD-10-CM

## 2023-11-08 LAB
HOLD SPECIMEN: NORMAL
TROPONIN I SERPL-MCNC: 0.01 NG/ML (ref 0–0.04)

## 2023-11-08 PROCEDURE — 25000003 PHARM REV CODE 250: Performed by: PHYSICIAN ASSISTANT

## 2023-11-08 PROCEDURE — 84484 ASSAY OF TROPONIN QUANT: CPT | Performed by: PHYSICIAN ASSISTANT

## 2023-11-08 PROCEDURE — 99285 EMERGENCY DEPT VISIT HI MDM: CPT

## 2023-11-08 PROCEDURE — 93005 ELECTROCARDIOGRAM TRACING: CPT

## 2023-11-08 RX ORDER — MELOXICAM 7.5 MG/1
7.5 TABLET ORAL DAILY
Qty: 14 TABLET | Refills: 0 | Status: SHIPPED | OUTPATIENT
Start: 2023-11-08 | End: 2023-11-22

## 2023-11-08 RX ORDER — ACETAMINOPHEN AND CODEINE PHOSPHATE 300; 30 MG/1; MG/1
1 TABLET ORAL
Status: COMPLETED | OUTPATIENT
Start: 2023-11-08 | End: 2023-11-08

## 2023-11-08 RX ADMIN — ACETAMINOPHEN AND CODEINE PHOSPHATE 1 TABLET: 300; 30 TABLET ORAL at 08:11

## 2023-11-08 NOTE — ED PROVIDER NOTES
Encounter Date: 11/8/2023       History     Chief Complaint   Patient presents with    Shoulder Pain     L shoulder pain. Hx arthritis      Patient reports to the emergency room with left shoulder and left-sided neck pain for the past 3 weeks; patient reports history of similar symptoms past and has been told she has arthritis; patient any chest pain or shortness of breath    The history is provided by the patient.   Shoulder Pain  This is a recurrent problem. The current episode started more than 1 week ago. The problem has not changed since onset.Pertinent negatives include no chest pain, no abdominal pain, no headaches and no shortness of breath. The symptoms are aggravated by twisting.     Review of patient's allergies indicates:  No Known Allergies  Past Medical History:   Diagnosis Date    Hypertension     Personal history of colonic polyps 12/12/2017     Past Surgical History:   Procedure Laterality Date    COLONOSCOPY W/ POLYPECTOMY  12/12/2017    Dr. Jared Nesbitt    TUBAL LIGATION       Family History   Family history unknown: Yes     Social History     Tobacco Use    Smoking status: Never     Passive exposure: Never    Smokeless tobacco: Never   Substance Use Topics    Alcohol use: Yes     Alcohol/week: 10.0 standard drinks of alcohol     Types: 10 Cans of beer per week     Comment: weekends    Drug use: Never     Review of Systems   Constitutional:  Negative for fever.   HENT:  Negative for sore throat.    Eyes: Negative.    Respiratory:  Negative for shortness of breath.    Cardiovascular:  Negative for chest pain.   Gastrointestinal:  Negative for abdominal pain and nausea.   Genitourinary:  Negative for dysuria.   Musculoskeletal:  Positive for neck pain. Negative for back pain.   Skin:  Negative for rash.   Neurological:  Negative for weakness and headaches.   Hematological:  Does not bruise/bleed easily.   Psychiatric/Behavioral: Negative.         Physical Exam     Initial Vitals [11/08/23 0818]    BP Pulse Resp Temp SpO2   (!) 146/87 78 20 98.1 °F (36.7 °C) 100 %      MAP       --         Physical Exam    Vitals reviewed.  Constitutional: She appears well-developed and well-nourished.   HENT:   Head: Normocephalic and atraumatic.   Eyes: Conjunctivae and EOM are normal. Pupils are equal, round, and reactive to light.   Neck: Neck supple.   Normal range of motion.  Cardiovascular:  Normal rate, regular rhythm, normal heart sounds and intact distal pulses.           Pulmonary/Chest: Breath sounds normal. No respiratory distress. She has no wheezes. She exhibits no tenderness.   Abdominal: Abdomen is soft. Bowel sounds are normal. There is no abdominal tenderness.   Musculoskeletal:      Right shoulder: Normal.      Left shoulder: Tenderness present. No swelling. Decreased range of motion. Normal pulse.        Arms:       Cervical back: Normal range of motion and neck supple. Tenderness present. Pain with movement present. Normal range of motion.      Thoracic back: Normal.      Lumbar back: Normal.        Back:      Neurological: She is alert and oriented to person, place, and time. She displays normal reflexes. No cranial nerve deficit or sensory deficit.   Skin: Skin is warm and dry.   Psychiatric: She has a normal mood and affect. Her behavior is normal. Judgment and thought content normal.         ED Course   Procedures  Labs Reviewed   TROPONIN I - Normal   EXTRA TUBES    Narrative:     The following orders were created for panel order EXTRA TUBES.  Procedure                               Abnormality         Status                     ---------                               -----------         ------                     Lavender Top Hold[[id number]]                               In process                   Please view results for these tests on the individual orders.   LAVENDER TOP HOLD     EKG Readings: (Independently Interpreted)   Initial Reading: No STEMI. Rhythm: Normal Sinus Rhythm. Heart Rate:  73. Ectopy: No Ectopy. Conduction: Normal. ST Segments: Normal ST Segments. T Waves: Normal. Clinical Impression: Normal Sinus Rhythm     ECG Results              EKG 12-lead (Final result)  Result time 11/08/23 10:18:02      Final result by [facility] (11/08/23 10:18:02)                   Narrative:    Test Reason : M54.2,    Vent. Rate : 073 BPM     Atrial Rate : 073 BPM     P-R Int : 168 ms          QRS Dur : 098 ms      QT Int : 388 ms       P-R-T Axes : 058 -59 039 degrees     QTc Int : 427 ms    Normal sinus rhythm  Left axis deviation  Nonspecific T wave abnormality  Abnormal ECG  When compared with ECG of 07-SEP-2023 03:59,  ST no longer depressed in Inferior leads  ST no longer depressed in Anterior leads  T wave inversion no longer evident in Inferior leads  Nonspecific T wave abnormality has replaced inverted T waves in Anterior  leads  Confirmed by Misael Smith MD ([id number]) on 11/8/2023 10:17:51 AM    Referred By: [email or web address]   SELF           Confirmed By:Misael Smith MD                                  Imaging Results              X-Ray Shoulder 2 or More Views Left (Final result)  Result time 11/08/23 10:03:27      Final result by Chu Gonzalez MD (11/08/23 10:03:27)                   Impression:      Degenerative changes.    Changes suggestive of rotator cuff injury.      Electronically signed by: Chu Gonzalez  Date:    11/08/2023  Time:    10:03               Narrative:    EXAMINATION:  XR SHOULDER COMPLETE 2 OR MORE VIEWS LEFT    CLINICAL HISTORY:  shoulder pain;    COMPARISON:  None.    FINDINGS:  No acute displaced fractures or dislocations.    There is narrowing of the acromioclavicular joint with some degenerative changes of the glenohumeral joint there is decreased distance between the humeral head in the acromion indicating some degree of rotator cuff injury    No blastic or lytic lesions.    Soft tissues within normal limits.                                        X-Ray Cervical Spine AP And Lateral (Final result)  Result time 11/08/23 10:16:00      Final result by Demario Wang MD (11/08/23 10:16:00)                   Impression:      No acute osseous findings      Electronically signed by: Demario Wang MD  Date:    11/08/2023  Time:    10:16               Narrative:    EXAMINATION:  Four views cervical spine    CLINICAL HISTORY:  Neck pain    COMPARISON:  10/23/2017    FINDINGS:  Disc space narrowing is at C5-6 and C6-7.  There is mild bilateral facet hypertrophy.  Alignment is normal.  Vertebral body heights are normal.  No fracture or traumatic subluxation.                                       Medications   acetaminophen-codeine 300-30mg per tablet 1 tablet (1 tablet Oral Given 11/8/23 0851)     Medical Decision Making  Amount and/or Complexity of Data Reviewed  Radiology: ordered.    Risk  Prescription drug management.  Risk Details: Given strict ED return precautions. I have spoken with the patient and/or caregivers. I have explained the patient's condition, diagnoses and treatment plan based on the information available to me at this time. I have answered the patient's and/or caregiver's questions and addressed any concerns. The patient and/or caregivers have as good an understanding of the patient's diagnosis, condition and treatment plan as can be expected at this point. The vital signs have been stable. The patient's condition is stable and appropriate for discharge from the emergency department.      The patient will pursue further outpatient evaluation with the primary care physician or other designated or consulting physician as outlined in the discharge instructions. The patient and/or caregivers are agreeable to this plan of care and follow-up instructions have been explained in detail. The patient and/or caregivers have received these instructions in written format and have expressed an understanding of the discharge instructions. The patient and/or  caregivers are aware that any significant change in condition or worsening of symptoms should prompt an immediate return to this or the closest emergency department or a call to 911.                                 Clinical Impression:   Final diagnoses:  [M54.2] Neck pain  [M67.912] Disorder of left rotator cuff (Primary)        ED Disposition Condition    Discharge Stable          ED Prescriptions       Medication Sig Dispense Start Date End Date Auth. Provider    meloxicam (MOBIC) 7.5 MG tablet Take 1 tablet (7.5 mg total) by mouth once daily. for 14 days 14 tablet 11/8/2023 11/22/2023 Srikanth Lyles PA          Follow-up Information       Follow up With Specialties Details Why Contact Info    discharge followup    If your symptoms become WORSE or you DO NOT IMPROVE and you are unable to reach your health care provider, you should RETURN to the emergency department    Elicia Schmidt FNP Nurse Practitioner   [address] [facility]  Internal Medicine Clinic  [facility] [id number] [phone]      discharge info    Discussed all pertinent ED information, results, diagnosis and treatment plan; All questions and concerns were addressed at this time. Patient voices understanding of information and instructions. Patient is comfortable with plan and discharge             Srikanth Lyles PA  11/08/23 1023

## 2023-11-16 ENCOUNTER — LAB VISIT (OUTPATIENT)
Dept: LAB | Facility: HOSPITAL | Age: 73
End: 2023-11-16
Attending: NURSE PRACTITIONER
Payer: COMMERCIAL

## 2023-11-16 DIAGNOSIS — N18.31 STAGE 3A CHRONIC KIDNEY DISEASE: Chronic | ICD-10-CM

## 2023-11-16 DIAGNOSIS — I10 PRIMARY HYPERTENSION: Chronic | ICD-10-CM

## 2023-11-16 DIAGNOSIS — E78.2 MIXED HYPERLIPIDEMIA: ICD-10-CM

## 2023-11-16 LAB
ALBUMIN SERPL-MCNC: 3.6 G/DL (ref 3.4–4.8)
ALBUMIN/GLOB SERPL: 0.9 RATIO (ref 1.1–2)
ALP SERPL-CCNC: 62 UNIT/L (ref 40–150)
ALT SERPL-CCNC: 15 UNIT/L (ref 0–55)
APPEARANCE UR: ABNORMAL
AST SERPL-CCNC: 27 UNIT/L (ref 5–34)
BACTERIA #/AREA URNS AUTO: ABNORMAL /HPF
BILIRUB SERPL-MCNC: 0.5 MG/DL
BILIRUB UR QL STRIP.AUTO: NEGATIVE
BUN SERPL-MCNC: 17.4 MG/DL (ref 9.8–20.1)
CALCIUM SERPL-MCNC: 9.1 MG/DL (ref 8.4–10.2)
CHLORIDE SERPL-SCNC: 109 MMOL/L (ref 98–107)
CHOLEST SERPL-MCNC: 196 MG/DL
CHOLEST/HDLC SERPL: 2 {RATIO} (ref 0–5)
CO2 SERPL-SCNC: 21 MMOL/L (ref 23–31)
COLOR UR AUTO: YELLOW
CREAT SERPL-MCNC: 1.13 MG/DL (ref 0.55–1.02)
CREAT UR-MCNC: 265 MG/DL (ref 45–106)
GFR SERPLBLD CREATININE-BSD FMLA CKD-EPI: 51 MLS/MIN/1.73/M2
GLOBULIN SER-MCNC: 3.8 GM/DL (ref 2.4–3.5)
GLUCOSE SERPL-MCNC: 76 MG/DL (ref 82–115)
GLUCOSE UR QL STRIP.AUTO: NORMAL
HDLC SERPL-MCNC: 90 MG/DL (ref 35–60)
HYALINE CASTS #/AREA URNS LPF: ABNORMAL /LPF
KETONES UR QL STRIP.AUTO: NEGATIVE
LDLC SERPL CALC-MCNC: 92 MG/DL (ref 50–140)
LEUKOCYTE ESTERASE UR QL STRIP.AUTO: 250
MICROALBUMIN UR-MCNC: 77.3 UG/ML
MICROALBUMIN/CREAT RATIO PNL UR: 29.2 MG/GM CR (ref 0–30)
MUCOUS THREADS URNS QL MICRO: ABNORMAL /LPF
NITRITE UR QL STRIP.AUTO: NEGATIVE
PH UR STRIP.AUTO: 5 [PH]
POTASSIUM SERPL-SCNC: 3.9 MMOL/L (ref 3.5–5.1)
PROT SERPL-MCNC: 7.4 GM/DL (ref 5.8–7.6)
PROT UR QL STRIP.AUTO: ABNORMAL
RBC #/AREA URNS AUTO: ABNORMAL /HPF
RBC UR QL AUTO: NEGATIVE
SODIUM SERPL-SCNC: 141 MMOL/L (ref 136–145)
SP GR UR STRIP.AUTO: 1.02 (ref 1–1.03)
SQUAMOUS #/AREA URNS LPF: ABNORMAL /HPF
TRIGL SERPL-MCNC: 69 MG/DL (ref 37–140)
UROBILINOGEN UR STRIP-ACNC: NORMAL
VLDLC SERPL CALC-MCNC: 14 MG/DL
WBC #/AREA URNS AUTO: ABNORMAL /HPF

## 2023-11-16 PROCEDURE — 82043 UR ALBUMIN QUANTITATIVE: CPT

## 2023-11-16 PROCEDURE — 81001 URINALYSIS AUTO W/SCOPE: CPT

## 2023-11-16 PROCEDURE — 36415 COLL VENOUS BLD VENIPUNCTURE: CPT

## 2023-11-16 PROCEDURE — 80061 LIPID PANEL: CPT

## 2023-11-16 PROCEDURE — 80053 COMPREHEN METABOLIC PANEL: CPT

## 2023-11-22 ENCOUNTER — OFFICE VISIT (OUTPATIENT)
Dept: INTERNAL MEDICINE | Facility: CLINIC | Age: 73
End: 2023-11-22
Payer: COMMERCIAL

## 2023-11-22 VITALS
SYSTOLIC BLOOD PRESSURE: 138 MMHG | HEART RATE: 88 BPM | DIASTOLIC BLOOD PRESSURE: 78 MMHG | RESPIRATION RATE: 20 BRPM | BODY MASS INDEX: 38.79 KG/M2 | HEIGHT: 62 IN | TEMPERATURE: 99 F

## 2023-11-22 DIAGNOSIS — M99.79 NARROWING OF INTERVERTEBRAL DISC SPACE: Chronic | ICD-10-CM

## 2023-11-22 DIAGNOSIS — R41.3 MEMORY LOSS: ICD-10-CM

## 2023-11-22 DIAGNOSIS — R41.3 OTHER AMNESIA: ICD-10-CM

## 2023-11-22 DIAGNOSIS — I10 PRIMARY HYPERTENSION: Primary | Chronic | ICD-10-CM

## 2023-11-22 DIAGNOSIS — N18.31 STAGE 3A CHRONIC KIDNEY DISEASE: Chronic | ICD-10-CM

## 2023-11-22 DIAGNOSIS — M75.102 ROTATOR CUFF SYNDROME OF LEFT SHOULDER: ICD-10-CM

## 2023-11-22 PROCEDURE — 3075F SYST BP GE 130 - 139MM HG: CPT | Mod: CPTII,,, | Performed by: NURSE PRACTITIONER

## 2023-11-22 PROCEDURE — 3075F PR MOST RECENT SYSTOLIC BLOOD PRESS GE 130-139MM HG: ICD-10-PCS | Mod: CPTII,,, | Performed by: NURSE PRACTITIONER

## 2023-11-22 PROCEDURE — 4010F PR ACE/ARB THEARPY RXD/TAKEN: ICD-10-PCS | Mod: CPTII,,, | Performed by: NURSE PRACTITIONER

## 2023-11-22 PROCEDURE — 3078F PR MOST RECENT DIASTOLIC BLOOD PRESSURE < 80 MM HG: ICD-10-PCS | Mod: CPTII,,, | Performed by: NURSE PRACTITIONER

## 2023-11-22 PROCEDURE — 4010F ACE/ARB THERAPY RXD/TAKEN: CPT | Mod: CPTII,,, | Performed by: NURSE PRACTITIONER

## 2023-11-22 PROCEDURE — 99214 OFFICE O/P EST MOD 30 MIN: CPT | Mod: PBBFAC | Performed by: NURSE PRACTITIONER

## 2023-11-22 PROCEDURE — 99214 OFFICE O/P EST MOD 30 MIN: CPT | Mod: S$PBB,,, | Performed by: NURSE PRACTITIONER

## 2023-11-22 PROCEDURE — 3060F POS MICROALBUMINURIA REV: CPT | Mod: CPTII,,, | Performed by: NURSE PRACTITIONER

## 2023-11-22 PROCEDURE — 1160F PR REVIEW ALL MEDS BY PRESCRIBER/CLIN PHARMACIST DOCUMENTED: ICD-10-PCS | Mod: CPTII,,, | Performed by: NURSE PRACTITIONER

## 2023-11-22 PROCEDURE — 3078F DIAST BP <80 MM HG: CPT | Mod: CPTII,,, | Performed by: NURSE PRACTITIONER

## 2023-11-22 PROCEDURE — 3066F NEPHROPATHY DOC TX: CPT | Mod: CPTII,,, | Performed by: NURSE PRACTITIONER

## 2023-11-22 PROCEDURE — 1159F MED LIST DOCD IN RCRD: CPT | Mod: CPTII,,, | Performed by: NURSE PRACTITIONER

## 2023-11-22 PROCEDURE — 3008F PR BODY MASS INDEX (BMI) DOCUMENTED: ICD-10-PCS | Mod: CPTII,,, | Performed by: NURSE PRACTITIONER

## 2023-11-22 PROCEDURE — 3066F PR DOCUMENTATION OF TREATMENT FOR NEPHROPATHY: ICD-10-PCS | Mod: CPTII,,, | Performed by: NURSE PRACTITIONER

## 2023-11-22 PROCEDURE — 3060F PR POS MICROALBUMINURIA RESULT DOCUMENTED/REVIEW: ICD-10-PCS | Mod: CPTII,,, | Performed by: NURSE PRACTITIONER

## 2023-11-22 PROCEDURE — 1159F PR MEDICATION LIST DOCUMENTED IN MEDICAL RECORD: ICD-10-PCS | Mod: CPTII,,, | Performed by: NURSE PRACTITIONER

## 2023-11-22 PROCEDURE — 3008F BODY MASS INDEX DOCD: CPT | Mod: CPTII,,, | Performed by: NURSE PRACTITIONER

## 2023-11-22 PROCEDURE — 1160F RVW MEDS BY RX/DR IN RCRD: CPT | Mod: CPTII,,, | Performed by: NURSE PRACTITIONER

## 2023-11-22 PROCEDURE — 99214 PR OFFICE/OUTPT VISIT, EST, LEVL IV, 30-39 MIN: ICD-10-PCS | Mod: S$PBB,,, | Performed by: NURSE PRACTITIONER

## 2023-11-22 NOTE — PROGRESS NOTES
KAREL Harris   [facility]  [address] [facility] [address]      PATIENT NAME: Shelby Larios  : 1950  DATE: 23  MRN: [id number]      Reason for Visit / Chief Complaint: Results, Follow-up, and Medication Refill       History of Present Illness / Problem Focused Workflow     Shelby Larios is a 73 y.o. Black or  female presents to the clinic for HTN f/u. PMH HTN, gout, VIC (machine returned d/t noncompliance), CKD st 3, bilateral knee OA (R>L), and morbid obesity. Followed by [facility] cardio clinic. Followed by [facility] ortho clinic and geriatric clinic (return prn).     Hypertension: Patient here for follow-up of elevated blood pressure. She is not exercising and is not adherent to low salt diet.  Blood pressure is well controlled at home. Cardiac symptoms none. Patient denies chest pain, chest pressure/discomfort, claudication, dyspnea, exertional chest pressure/discomfort, fatigue, irregular heart beat, lower extremity edema, near-syncope, orthopnea, palpitations, paroxysmal nocturnal dyspnea, syncope, and tachypnea.  Cardiovascular risk factors: advanced age (older than 55 for men, 65 for women), dyslipidemia, hypertension, obesity (BMI >= 30 kg/m2), and sedentary lifestyle. Use of agents associated with hypertension: NSAIDS. History of target organ damage: chronic kidney disease. Reports med compliance. Attempts low sodium diet; does not consume adequate water intake. Pt and  continue to report repeating questions and forgetfulness daily. Declines vaccines today. Denies chest pain, shortness of breath, cough, fever, headache, dizziness, weakness, abdominal pain, nausea, vomiting, diarrhea, constipation, dysuria, depression, anxiety, SI/HI.    Review of Systems     Review of Systems     See HPI for details    Constitutional: Denies Change in appetite. Denies Chills. Denies Fever. Denies Night  sweats.  Eye: Denies Blurred vision. Denies Discharge. Denies Eye pain.  ENT: Denies Decreased hearing. Denies Sore throat. Denies Swollen glands.  Respiratory: Denies Cough. Denies Shortness of breath. Denies Shortness of breath with exertion. Denies Wheezing.  Cardiovascular: DeniesChest pain at rest. Denies Chest pain with exertion. Denies Irregular heartbeat. Denies Palpitations. Denies Edema.  Gastrointestinal: Denies Abdominal pain. Denies Diarrhea. Denies Nausea. Denies Vomiting. Denies Hematemesis or Hematochezia.  Genitourinary: Denies Dysuria. Denies Urinary frequency. Denies Urinary urgency. Denies Blood in urine.  Endocrine: Denies Cold intolerance. Denies Excessive thirst. Denies Heat intolerance. Denies Weight loss. Denies Weight gain.  Musculoskeletal: Admits Painful joints. Denies Weakness.  Integumentary: Denies Rash. Denies Itching. Denies Dry skin.  Neurologic: Denies Dizziness. Denies Fainting. Denies Headache.  Psychiatric: Denies Depression. Denies Anxiety. Denies Suicidal/Homicidal ideations.    All Other ROS: Negative except as stated in HPI.     Medical / Surgical / Social / Family History       ----------------------------  Hypertension  Personal history of colonic polyps     Past Surgical History:   Procedure Laterality Date    COLONOSCOPY W/ POLYPECTOMY  12/12/2017    Dr. Jared Nesbitt    TUBAL LIGATION         Social History     Socioeconomic History    Marital status:      Spouse name: Александр    Number of children: 8   Occupational History    Occupation: retired   Tobacco Use    Smoking status: Never     Passive exposure: Never    Smokeless tobacco: Never   Substance and Sexual Activity    Alcohol use: Yes     Alcohol/week: 10.0 standard drinks of alcohol     Types: 10 Cans of beer per week     Comment: weekends    Drug use: Never    Sexual activity: Not Currently     Partners: Male     Social Determinants of Health     Financial Resource Strain: Low Risk  (7/19/2023)    Overall  Financial Resource Strain (CARDIA)     Difficulty of Paying Living Expenses: Not hard at all   Food Insecurity: No Food Insecurity (7/19/2023)    Hunger Vital Sign     Worried About Running Out of Food in the Last Year: Never true     Ran Out of Food in the Last Year: Never true   Transportation Needs: No Transportation Needs (7/19/2023)    PRAPARE - Transportation     Lack of Transportation (Medical): No     Lack of Transportation (Non-Medical): No   Physical Activity: Insufficiently Active (7/19/2023)    Exercise Vital Sign     Days of Exercise per Week: 5 days     Minutes of Exercise per Session: 10 min   Stress: No Stress Concern Present (7/19/2023)    [employer] - Occupational Stress Questionnaire     Feeling of Stress : Only a little   Social Connections: Moderately Integrated (7/19/2023)    Social Connection and Isolation Panel [NHANES]     Frequency of Communication with Friends and Family: Twice a week     Frequency of Social Gatherings with Friends and Family: Once a week     Attends Mu-ism Services: More than 4 times per year     Active Member of Clubs or Organizations: No     Attends Club or Organization Meetings: Never     Marital Status:    Housing Stability: Low Risk  (12/8/2022)    Housing Stability Vital Sign     Unable to Pay for Housing in the Last Year: No     Number of Places Lived in the Last Year: 1     Unstable Housing in the Last Year: No        Family History   Family history unknown: Yes        Medications and Allergies     Medications  Current Outpatient Medications   Medication Instructions    acetaminophen (TYLENOL) 1,000 mg, Oral, Every 8 hours PRN    amLODIPine (NORVASC) 10 mg, Oral, Nightly    aspirin 81 mg, Oral, Daily    atorvastatin (LIPITOR) 20 mg, Oral, Daily    lisinopriL 10 mg, Oral, Daily    meloxicam (MOBIC) 7.5 mg, Oral, Daily    XIIDRA 5 % Dpet 1 drop, Both Eyes, 2 times daily         Allergies  Review of patient's allergies  "indicates:  No Known Allergies    Physical Examination     /78 (BP Location: Right arm, Patient Position: Sitting, BP Method: Large (Manual))   Pulse 88   Temp 98.7 °F (37.1 °C) (Oral)   Resp 20   Ht 5' 2" (1.575 m)   BMI 38.79 kg/m²     Physical Exam  Constitutional:       Appearance: She is obese.   Musculoskeletal:      Left shoulder: Tenderness present.        Arms:          General: Alert and oriented, No acute distress.  Head: Normocephalic, Atraumatic.  Eye: Pupils are equal, round and reactive to light, Extraocular movements are intact, Sclera non-icteric.  Ears/Nose/Throat: Normal, Mucosa moist,Clear.  Neck/Thyroid: Supple, Non-tender, No carotid bruit, No lymphadenopathy, No JVD, Full range of motion.  Respiratory: Clear to auscultation bilaterally; No wheezes, rales or rhonchi,Non-labored respirations, Symmetrical chest wall expansion.  Cardiovascular: Regular rate and rhythm, S1/S2 normal, No murmurs, rubs or gallops.  Gastrointestinal: Soft, Non-tender, Non-distended, Normal bowel sounds, No palpable organomegaly.  Integumentary: Warm, Dry, Intact, No suspicious lesions or rashes.  Extremities: No clubbing, cyanosis or edema  Neurologic: No focal deficits, Cranial Nerves II-XII are grossly intact, Motor strength normal upper and lower extremities, Sensory exam intact.  Psychiatric: Normal interaction, Coherent speech, Appropriate affect       Results     Lab Results   Component Value Date    WBC 5.56 09/07/2023    HGB 12.8 09/07/2023    HCT 40.1 09/07/2023     09/07/2023    CHOL 196 11/16/2023    TRIG 69 11/16/2023    ALT 15 11/16/2023    AST 27 11/16/2023     11/16/2023    K 3.9 11/16/2023    CREATININE 1.13 (H) 11/16/2023    BUN 17.4 11/16/2023    CO2 21 (L) 11/16/2023    TSH 0.699 07/03/2023    INR 0.99 09/27/2017    HGBA1C 5.3 12/09/2022     X-Ray Cervical Spine AP And Lateral  Order: [id number]  Status: Final result       Visible to patient: No (inaccessible in Patient " Portal)       Next appt: None    0 Result Notes  Details    Reading Physician Reading Date Result Priority   Demario Wang MD  [phone] 11/8/2023 STAT     Narrative & Impression  EXAMINATION:  Four views cervical spine     CLINICAL HISTORY:  Neck pain     COMPARISON:  10/23/2017     FINDINGS:  Disc space narrowing is at C5-6 and C6-7.  There is mild bilateral facet hypertrophy.  Alignment is normal.  Vertebral body heights are normal.  No fracture or traumatic subluxation.     Impression:     No acute osseous findings        Electronically signed by: Demario Wang MD  Date:                                            11/08/2023  Time:                                           10:16   X-Ray Shoulder 2 or More Views Left  Order: [id number]  Status: Final result       Visible to patient: No (inaccessible in Patient Portal)       Next appt: None    0 Result Notes  Details    Reading Physician Reading Date Result Priority   Chu Gonzalez MD  [phone] 11/8/2023 STAT     Narrative & Impression  EXAMINATION:  XR SHOULDER COMPLETE 2 OR MORE VIEWS LEFT     CLINICAL HISTORY:  shoulder pain;     COMPARISON:  None.     FINDINGS:  No acute displaced fractures or dislocations.     There is narrowing of the acromioclavicular joint with some degenerative changes of the glenohumeral joint there is decreased distance between the humeral head in the acromion indicating some degree of rotator cuff injury     No blastic or lytic lesions.     Soft tissues within normal limits.     Impression:     Degenerative changes.     Changes suggestive of rotator cuff injury.        Electronically signed by: Chu Gonzalez  Date:                                            11/08/2023  Time:                                           10:03       Assessment and Plan (including Health Maintenance)     Plan:     1. Primary hypertension  At goal.  Continue lisinopril and amlodipine.  Follow a low sodium (less than 2 grams of sodium per  day), DASH diet.   Continue medications as prescribed.  Monitor blood pressure and report any consistent values greater than 140/90 and keep a log.  Maintain healthy weight with a BMI goal of <30.   Aerobic exercise for 150 minutes per week (or 5 days a week for 30 minutes each day).  - Comprehensive Metabolic Panel; Future    2. Memory loss  CT ordered.  Memory exercises encouraged.  - CT Head Without Contrast; Future    3. Rotator cuff syndrome of left shoulder  Declines referral to ortho and PT today.  Home exercises provided.  D/C mobic.  Take OTC tylenol prn pain; understanding voiced.    4. Narrowing of intervertebral disc space  Declines referral to PT today.   Perform neck stretches daily.   Avoid activities than increase neck pain or stiffness.   Apply heating pad, ice pack, and BioFreeze as needed; alternate every 15-20 minutes.   Massage neck to loosen neck muscles.   Continue tylenol arthritis/muscle relaxer as needed.      Problem List Items Addressed This Visit          Neuro    Narrowing of intervertebral disc space (Chronic)    Memory loss       Cardiac/Vascular    Hypertension - Primary (Chronic)    Relevant Orders    Comprehensive Metabolic Panel       Renal/    Stage 3a chronic kidney disease (Chronic)    Relevant Orders    Comprehensive Metabolic Panel       Orthopedic    Rotator cuff syndrome of left shoulder     Other Visit Diagnoses       Other amnesia        Relevant Orders    CT Head Without Contrast              Health Maintenance Due   Topic Date Due    DEXA Scan  Never done    RSV Vaccine (Age 60+ and Pregnant patients) (1 - 1-dose 60+ series) Never done    Colorectal Cancer Screening  12/12/2022    COVID-19 Vaccine (4 - 2023-24 season) 09/01/2023    Shingles Vaccine (2 of 2) 10/24/2023       Follow up in about 3 months (around 2/22/2024) for Follow up, Lab Results, HTN.        Signature:  KAREL Harris  [facility]  [address]  [facility] [address]

## 2023-11-27 ENCOUNTER — TELEPHONE (OUTPATIENT)
Dept: INTERNAL MEDICINE | Facility: CLINIC | Age: 73
End: 2023-11-27
Payer: COMMERCIAL

## 2023-11-27 NOTE — TELEPHONE ENCOUNTER
----- Message from Dyana Posada sent at 11/22/2023  1:13 PM CST -----  Regarding: Medication Refill  Patient called and stated she needs a medication refill for lisinopriL 10 MG tablet     Pharmacy: [facility] [address] [place] [id number] [address]    Last OV : 11/22/23  Next OV: 2/22/24    Patient can be reached at [phone]     Please Advise

## 2023-11-27 NOTE — TELEPHONE ENCOUNTER
Spoke to dean and Augustina at [facility]. Pt has 90 day supply with no refills available at pharmacy that will be picked up on today.

## 2023-11-28 DIAGNOSIS — G47.33 OSA (OBSTRUCTIVE SLEEP APNEA): Primary | ICD-10-CM

## 2023-11-28 RX ORDER — LISINOPRIL 10 MG/1
10 TABLET ORAL DAILY
Qty: 90 TABLET | Refills: 1 | Status: SHIPPED | OUTPATIENT
Start: 2023-11-28

## 2024-02-23 RX ORDER — ATORVASTATIN CALCIUM 20 MG/1
20 TABLET, FILM COATED ORAL DAILY
Qty: 90 TABLET | Refills: 1 | Status: SHIPPED | OUTPATIENT
Start: 2024-02-23

## 2024-07-09 DIAGNOSIS — I10 PRIMARY HYPERTENSION: Chronic | ICD-10-CM

## 2024-07-11 RX ORDER — AMLODIPINE BESYLATE 10 MG/1
10 TABLET ORAL NIGHTLY
Qty: 90 TABLET | Refills: 0 | Status: SHIPPED | OUTPATIENT
Start: 2024-07-11

## 2024-07-11 NOTE — TELEPHONE ENCOUNTER
I refilled the medication for one month but the patient needs to be scheduled for an appointment with new PCP for additional refills. Please arrange this.

## 2024-08-27 RX ORDER — LISINOPRIL 10 MG/1
10 TABLET ORAL
Qty: 90 TABLET | Refills: 0 | Status: SHIPPED | OUTPATIENT
Start: 2024-08-27

## 2024-08-27 NOTE — TELEPHONE ENCOUNTER
Pharmacy requesting refills on lisinopriL 10 MG tablet. Please advise.      LOV:11/22/23    NOV: 9/25/24

## 2024-08-27 NOTE — TELEPHONE ENCOUNTER
Called pt to inform her I scheduled her incorrectly and pts appt will need to be moved to sometime in December at first availability and will call pt to move up appointment as needed. Unable to LVM.

## 2024-09-10 RX ORDER — ATORVASTATIN CALCIUM 20 MG/1
20 TABLET, FILM COATED ORAL
Qty: 90 TABLET | Refills: 0 | Status: SHIPPED | OUTPATIENT
Start: 2024-09-10

## 2024-09-10 NOTE — TELEPHONE ENCOUNTER
Pharmacy requesting refill for pt's atorvastatin (LIPITOR) 20 MG tablet    LOV: 11/22/23    NOV:10/29/24

## 2024-10-15 ENCOUNTER — HOSPITAL ENCOUNTER (EMERGENCY)
Facility: HOSPITAL | Age: 74
Discharge: HOME | End: 2024-10-15
Attending: EMERGENCY MEDICINE
Payer: MEDICARE

## 2024-10-15 VITALS
SYSTOLIC BLOOD PRESSURE: 160 MMHG | OXYGEN SATURATION: 99 % | DIASTOLIC BLOOD PRESSURE: 80 MMHG | HEART RATE: 88 BPM | RESPIRATION RATE: 18 BRPM | TEMPERATURE: 98 F

## 2024-10-15 DIAGNOSIS — L72.3 SEBACEOUS CYST: Primary | ICD-10-CM

## 2024-10-15 PROCEDURE — 99281 EMR DPT VST MAYX REQ PHY/QHP: CPT

## 2024-10-15 NOTE — DISCHARGE INSTRUCTIONS
Follow up with the [facility] for cyst excision.  Follow up with your primary care physician in 3-5 days for follow up evaluation.  Present to the [facility] ED if affected area becomes red, starts draining, or becomes painful.

## 2024-10-15 NOTE — ED PROVIDER NOTES
"Encounter Date: 10/15/2024       History     Chief Complaint   Patient presents with    Cyst     CYST TO FOREHEAD X 2 WKS. NO REDNESS OR DRAINAGE.      Pt is a 74 y.o. female who presents to the [facility] ED for evaluation of a "bump" to her forehead. Symptoms x 2 weeks. Denies redness to area, trauma to area, chest pain, SOB, weakness, dizziness, or fever. Hx of HTN.       Review of patient's allergies indicates:  No Known Allergies  Past Medical History:   Diagnosis Date    Hypertension     Personal history of colonic polyps 12/12/2017     Past Surgical History:   Procedure Laterality Date    COLONOSCOPY W/ POLYPECTOMY  12/12/2017    Dr. Jared Nesbitt    TUBAL LIGATION       Family History   Family history unknown: Yes     Social History     Tobacco Use    Smoking status: Never     Passive exposure: Never    Smokeless tobacco: Never   Substance Use Topics    Alcohol use: Yes     Alcohol/week: 10.0 standard drinks of alcohol     Types: 10 Cans of beer per week     Comment: weekends    Drug use: Never     Review of Systems   Constitutional:  Negative for chills, diaphoresis, fatigue and fever.   HENT:  Negative for facial swelling, rhinorrhea, sinus pressure, sinus pain, sore throat and trouble swallowing.    Respiratory:  Negative for cough, chest tightness, shortness of breath and wheezing.    Cardiovascular:  Negative for chest pain, palpitations and leg swelling.   Gastrointestinal:  Negative for abdominal pain, diarrhea, nausea and vomiting.   Genitourinary:  Negative for dysuria, flank pain, frequency, hematuria and urgency.   Musculoskeletal:  Negative for arthralgias, back pain, joint swelling and myalgias.   Skin:  Negative for color change and rash.        "Bump" to forehead   Neurological:  Negative for dizziness, syncope, weakness and light-headedness.   Hematological:  Does not bruise/bleed easily.   All other systems reviewed and are negative.      Physical Exam     Initial Vitals [10/15/24 1005]   BP " Pulse Resp Temp SpO2   (!) 163/94 80 16 97.9 °F (36.6 °C) 98 %      MAP       --         Physical Exam    Nursing note and vitals reviewed.  Constitutional: She appears well-developed and well-nourished.   HENT:   Head: Normocephalic and atraumatic.       Nose: Nose normal. Mouth/Throat: Oropharynx is clear and moist.   Eyes: Conjunctivae and EOM are normal. Pupils are equal, round, and reactive to light.   Neck: Neck supple.   Normal range of motion.  Cardiovascular:  Normal rate, regular rhythm, normal heart sounds and intact distal pulses.           Pulmonary/Chest: Effort normal and breath sounds normal. No respiratory distress. She has no wheezes. She has no rhonchi. She has no rales. She exhibits no tenderness.   Abdominal: Abdomen is soft and flat. Bowel sounds are normal. She exhibits no distension. There is no abdominal tenderness. There is no rebound, no guarding, no tenderness at McBurney's point and negative Bird's sign. negative psoas sign  Musculoskeletal:         General: Normal range of motion.      Cervical back: Normal range of motion and neck supple.     Neurological: She is alert and oriented to person, place, and time. She has normal strength and normal reflexes.   Skin: Skin is warm and dry. Capillary refill takes less than 2 seconds.   Psychiatric: She has a normal mood and affect. Her speech is normal and behavior is normal. Judgment and thought content normal.         ED Course   Procedures  Labs Reviewed - No data to display       Imaging Results    None          Medications - No data to display  Medical Decision Making  Differential:  Sebaceous cyst                 ED Course as of 10/15/24 1027   Tue Oct 15, 2024   1025 Pt symptoms consistent with a sebaceous cyst to the area. No acute infection noted. I will be referring pt to the [facility] for cyst excision. Discussed plan with pt. Understanding and agreement voiced.  [JA]      ED Course User Index  [JA] Roxanne  KAREL Chun Jr.                           Clinical Impression:  Final diagnoses:  [L72.3] Sebaceous cyst (Primary)          ED Disposition Condition    Discharge Stable          ED Prescriptions    None       Follow-up Information       Follow up With Specialties Details Why Contact Info    Elicia Schmidt FNP Internal Medicine In 3 days  [address] [facility]  Internal Medicine Clinic  [facility] [address]  [phone]               Jasen Oliveira Jr., FNP  10/15/24 9042

## 2024-10-16 ENCOUNTER — PATIENT OUTREACH (OUTPATIENT)
Dept: EMERGENCY MEDICINE | Facility: HOSPITAL | Age: 74
End: 2024-10-16
Payer: MEDICARE

## 2024-10-16 NOTE — PROGRESS NOTES
I spoke with patient and her  Александр regarding scheduling a follow up with PCP after ED visit for a cyst. A referral was placed by the ED for Ms Ryan to be seen by one of the residents at [facility]. Since this staff schedules their own appointments a message was sent for scheduling assistance. ED navigator will follow-up with patient on/around 11/7/24.

## 2024-10-28 ENCOUNTER — PATIENT OUTREACH (OUTPATIENT)
Dept: EMERGENCY MEDICINE | Facility: HOSPITAL | Age: 74
End: 2024-10-28
Payer: MEDICARE

## 2024-10-29 ENCOUNTER — OFFICE VISIT (OUTPATIENT)
Dept: INTERNAL MEDICINE | Facility: CLINIC | Age: 74
End: 2024-10-29
Payer: MEDICARE

## 2024-10-29 VITALS
HEIGHT: 62 IN | DIASTOLIC BLOOD PRESSURE: 82 MMHG | BODY MASS INDEX: 40.12 KG/M2 | SYSTOLIC BLOOD PRESSURE: 138 MMHG | HEART RATE: 77 BPM | RESPIRATION RATE: 16 BRPM | TEMPERATURE: 98 F | OXYGEN SATURATION: 100 % | WEIGHT: 218 LBS

## 2024-10-29 DIAGNOSIS — K63.5 POLYP OF COLON, UNSPECIFIED PART OF COLON, UNSPECIFIED TYPE: ICD-10-CM

## 2024-10-29 DIAGNOSIS — N18.31 STAGE 3A CHRONIC KIDNEY DISEASE: Chronic | ICD-10-CM

## 2024-10-29 DIAGNOSIS — M10.9 GOUT, UNSPECIFIED CAUSE, UNSPECIFIED CHRONICITY, UNSPECIFIED SITE: Chronic | ICD-10-CM

## 2024-10-29 DIAGNOSIS — Z12.31 BREAST CANCER SCREENING BY MAMMOGRAM: ICD-10-CM

## 2024-10-29 DIAGNOSIS — E78.2 MIXED HYPERLIPIDEMIA: ICD-10-CM

## 2024-10-29 DIAGNOSIS — Z78.0 POST-MENOPAUSAL: ICD-10-CM

## 2024-10-29 DIAGNOSIS — R41.3 MEMORY LOSS: ICD-10-CM

## 2024-10-29 DIAGNOSIS — I10 PRIMARY HYPERTENSION: Primary | Chronic | ICD-10-CM

## 2024-10-29 PROBLEM — E66.01 CLASS 2 SEVERE OBESITY DUE TO EXCESS CALORIES WITH SERIOUS COMORBIDITY AND BODY MASS INDEX (BMI) OF 38.0 TO 38.9 IN ADULT: Chronic | Status: RESOLVED | Noted: 2022-12-08 | Resolved: 2024-10-29

## 2024-10-29 PROBLEM — E66.812 CLASS 2 SEVERE OBESITY DUE TO EXCESS CALORIES WITH SERIOUS COMORBIDITY AND BODY MASS INDEX (BMI) OF 38.0 TO 38.9 IN ADULT: Chronic | Status: RESOLVED | Noted: 2022-12-08 | Resolved: 2024-10-29

## 2024-10-29 PROCEDURE — 1160F RVW MEDS BY RX/DR IN RCRD: CPT | Mod: CPTII,,,

## 2024-10-29 PROCEDURE — 3008F BODY MASS INDEX DOCD: CPT | Mod: CPTII,,,

## 2024-10-29 PROCEDURE — 3075F SYST BP GE 130 - 139MM HG: CPT | Mod: CPTII,,,

## 2024-10-29 PROCEDURE — 99215 OFFICE O/P EST HI 40 MIN: CPT | Mod: PBBFAC

## 2024-10-29 PROCEDURE — 4010F ACE/ARB THERAPY RXD/TAKEN: CPT | Mod: CPTII,,,

## 2024-10-29 PROCEDURE — 3288F FALL RISK ASSESSMENT DOCD: CPT | Mod: CPTII,,,

## 2024-10-29 PROCEDURE — 1126F AMNT PAIN NOTED NONE PRSNT: CPT | Mod: CPTII,,,

## 2024-10-29 PROCEDURE — 1159F MED LIST DOCD IN RCRD: CPT | Mod: CPTII,,,

## 2024-10-29 PROCEDURE — 1101F PT FALLS ASSESS-DOCD LE1/YR: CPT | Mod: CPTII,,,

## 2024-10-29 PROCEDURE — G2211 COMPLEX E/M VISIT ADD ON: HCPCS | Mod: S$PBB,,,

## 2024-10-29 PROCEDURE — 99214 OFFICE O/P EST MOD 30 MIN: CPT | Mod: S$PBB,,,

## 2024-10-29 PROCEDURE — 3079F DIAST BP 80-89 MM HG: CPT | Mod: CPTII,,,

## 2024-11-12 ENCOUNTER — OFFICE VISIT (OUTPATIENT)
Dept: FAMILY MEDICINE | Facility: CLINIC | Age: 74
End: 2024-11-12
Payer: MEDICARE

## 2024-11-12 ENCOUNTER — HOSPITAL ENCOUNTER (OUTPATIENT)
Dept: RADIOLOGY | Facility: HOSPITAL | Age: 74
Discharge: HOME | End: 2024-11-12
Payer: MEDICARE

## 2024-11-12 VITALS
WEIGHT: 218 LBS | BODY MASS INDEX: 40.12 KG/M2 | SYSTOLIC BLOOD PRESSURE: 161 MMHG | OXYGEN SATURATION: 99 % | RESPIRATION RATE: 18 BRPM | HEIGHT: 62 IN | DIASTOLIC BLOOD PRESSURE: 67 MMHG | HEART RATE: 64 BPM

## 2024-11-12 DIAGNOSIS — Z78.0 POST-MENOPAUSAL: ICD-10-CM

## 2024-11-12 DIAGNOSIS — Z12.31 BREAST CANCER SCREENING BY MAMMOGRAM: ICD-10-CM

## 2024-11-12 DIAGNOSIS — L72.0 EPIDERMAL INCLUSION CYST: Primary | ICD-10-CM

## 2024-11-12 DIAGNOSIS — L72.3 SEBACEOUS CYST: ICD-10-CM

## 2024-11-12 PROCEDURE — 99213 OFFICE O/P EST LOW 20 MIN: CPT | Mod: PBBFAC,25

## 2024-11-12 PROCEDURE — 77080 DXA BONE DENSITY AXIAL: CPT | Mod: TC

## 2024-11-12 PROCEDURE — 77063 BREAST TOMOSYNTHESIS BI: CPT | Mod: 26,,, | Performed by: STUDENT IN AN ORGANIZED HEALTH CARE EDUCATION/TRAINING PROGRAM

## 2024-11-12 PROCEDURE — 77067 SCR MAMMO BI INCL CAD: CPT | Mod: 26,,, | Performed by: STUDENT IN AN ORGANIZED HEALTH CARE EDUCATION/TRAINING PROGRAM

## 2024-11-12 PROCEDURE — 77067 SCR MAMMO BI INCL CAD: CPT | Mod: TC

## 2024-11-12 NOTE — PROGRESS NOTES
Subjective:       Patient ID: Shelby Larios is a 74 y.o. female.    Chief Complaint: Cyst    HPI  75 yo referred to minor surgery for a cyst on her forehead. Lesion is not bothersome or painful to the patient. Unsure of how long it has been present. No other cysts. No drainage or infection    Review of Systems  As per HPI         Objective:      Vitals:    11/12/24 1047   BP: (!) 161/67   Pulse: 64   Resp: 18       Physical Exam    Gen: alert, no acute distress  Skin: 2 cm cystic lesion with central punctum superior aspect of forehead  Psych: cooperative, appropriate mood and affect    Assessment/Plan:  Epidermal inclusion cyst  - treatment options including excision versus monitoring discussed with patient and her daughter.  Patient does desire removal but daughter and patient would like to discuss it and return for excision at a follow-up appointment         Follow up in about 8 weeks (around 1/7/2025).

## 2024-11-13 ENCOUNTER — TELEPHONE (OUTPATIENT)
Dept: INTERNAL MEDICINE | Facility: CLINIC | Age: 74
End: 2024-11-13
Payer: MEDICARE

## 2024-11-13 NOTE — TELEPHONE ENCOUNTER
----- Message from Melani sent at 11/13/2024 10:08 AM CST -----  Regarding: returning call  Who Called: pt's daughterSonya    Patient is returning phone call    Who Left Message for Patient:Ludmila    Does the patient know what this is regarding?:results      Preferred Method of Contact: Phone Call  Patient's Preferred Phone Number on File: Sonya Kwok (Daughter)  [phone]    Best Call Back Number, if different:  Additional Information: Pt's daughter, Sonya, is returning a call to the clinic; please advise.

## 2024-11-13 NOTE — TELEPHONE ENCOUNTER
Please call patient's daughter and inform her that the DXA bone scan showed that the patient has low bone mass, also called osteopenia. This means she is at higher risk for bone fractures, but she does not have osteoporosis. She should start an over the counter vitamin D and calcium supplement. Weight bearing exercise and healthy diet is also importance to preserve bone mass.

## 2024-11-13 NOTE — TELEPHONE ENCOUNTER
Spoke with pt daughter regarding DEXA bone scan results and informed her to start her mother on otc calcium supplement. PT daughter verbalized understanding.

## 2024-11-14 ENCOUNTER — OFFICE VISIT (OUTPATIENT)
Dept: FAMILY MEDICINE | Facility: CLINIC | Age: 74
End: 2024-11-14
Payer: MEDICARE

## 2024-11-14 VITALS
RESPIRATION RATE: 20 BRPM | TEMPERATURE: 98 F | SYSTOLIC BLOOD PRESSURE: 129 MMHG | OXYGEN SATURATION: 99 % | HEART RATE: 79 BPM | BODY MASS INDEX: 39.79 KG/M2 | WEIGHT: 216.25 LBS | HEIGHT: 62 IN | DIASTOLIC BLOOD PRESSURE: 70 MMHG

## 2024-11-14 DIAGNOSIS — E66.812 CLASS 2 OBESITY WITH BODY MASS INDEX (BMI) OF 39.0 TO 39.9 IN ADULT, UNSPECIFIED OBESITY TYPE, UNSPECIFIED WHETHER SERIOUS COMORBIDITY PRESENT: ICD-10-CM

## 2024-11-14 DIAGNOSIS — R41.3 OTHER AMNESIA: ICD-10-CM

## 2024-11-14 DIAGNOSIS — I10 PRIMARY HYPERTENSION: ICD-10-CM

## 2024-11-14 DIAGNOSIS — Z00.00 HEALTHCARE MAINTENANCE: ICD-10-CM

## 2024-11-14 DIAGNOSIS — M85.80 OSTEOPENIA, UNSPECIFIED LOCATION: ICD-10-CM

## 2024-11-14 DIAGNOSIS — G47.33 OSA ON CPAP: ICD-10-CM

## 2024-11-14 DIAGNOSIS — R41.3 IMPAIRED MEMORY: ICD-10-CM

## 2024-11-14 DIAGNOSIS — R73.03 PREDIABETES: ICD-10-CM

## 2024-11-14 DIAGNOSIS — E78.5 HYPERLIPIDEMIA, UNSPECIFIED HYPERLIPIDEMIA TYPE: ICD-10-CM

## 2024-11-14 DIAGNOSIS — R41.3 MEMORY LOSS: ICD-10-CM

## 2024-11-14 DIAGNOSIS — G31.84 MILD COGNITIVE IMPAIRMENT: Primary | ICD-10-CM

## 2024-11-14 LAB
25(OH)D3+25(OH)D2 SERPL-MCNC: 14 NG/ML (ref 30–80)
ALBUMIN SERPL-MCNC: 3.2 G/DL (ref 3.4–4.8)
ALBUMIN/GLOB SERPL: 0.8 RATIO (ref 1.1–2)
ALP SERPL-CCNC: 63 UNIT/L (ref 40–150)
ALT SERPL-CCNC: 11 UNIT/L (ref 0–55)
ANION GAP SERPL CALC-SCNC: 8 MEQ/L
AST SERPL-CCNC: 22 UNIT/L (ref 5–34)
BASOPHILS # BLD AUTO: 0.03 X10(3)/MCL
BASOPHILS NFR BLD AUTO: 0.7 %
BILIRUB SERPL-MCNC: 0.4 MG/DL
BUN SERPL-MCNC: 10.6 MG/DL (ref 9.8–20.1)
CALCIUM SERPL-MCNC: 9.1 MG/DL (ref 8.4–10.2)
CHLORIDE SERPL-SCNC: 107 MMOL/L (ref 98–107)
CHOLEST SERPL-MCNC: 151 MG/DL
CHOLEST/HDLC SERPL: 2 {RATIO} (ref 0–5)
CO2 SERPL-SCNC: 24 MMOL/L (ref 23–31)
CREAT SERPL-MCNC: 1.29 MG/DL (ref 0.55–1.02)
CREAT/UREA NIT SERPL: 8
EOSINOPHIL # BLD AUTO: 0.09 X10(3)/MCL (ref 0–0.9)
EOSINOPHIL NFR BLD AUTO: 2 %
ERYTHROCYTE [DISTWIDTH] IN BLOOD BY AUTOMATED COUNT: 13.1 % (ref 11.5–17)
EST. AVERAGE GLUCOSE BLD GHB EST-MCNC: 99.7 MG/DL
GFR SERPLBLD CREATININE-BSD FMLA CKD-EPI: 44 ML/MIN/1.73/M2
GLOBULIN SER-MCNC: 4.1 GM/DL (ref 2.4–3.5)
GLUCOSE SERPL-MCNC: 96 MG/DL (ref 82–115)
HBA1C MFR BLD: 5.1 %
HCT VFR BLD AUTO: 39.8 % (ref 37–47)
HDLC SERPL-MCNC: 73 MG/DL (ref 35–60)
HGB BLD-MCNC: 13.4 G/DL (ref 12–16)
IMM GRANULOCYTES # BLD AUTO: 0.01 X10(3)/MCL (ref 0–0.04)
IMM GRANULOCYTES NFR BLD AUTO: 0.2 %
LDLC SERPL CALC-MCNC: 65 MG/DL (ref 50–140)
LYMPHOCYTES # BLD AUTO: 1.26 X10(3)/MCL (ref 0.6–4.6)
LYMPHOCYTES NFR BLD AUTO: 27.9 %
MCH RBC QN AUTO: 32 PG (ref 27–31)
MCHC RBC AUTO-ENTMCNC: 33.7 G/DL (ref 33–36)
MCV RBC AUTO: 95 FL (ref 80–94)
MONOCYTES # BLD AUTO: 0.27 X10(3)/MCL (ref 0.1–1.3)
MONOCYTES NFR BLD AUTO: 6 %
NEUTROPHILS # BLD AUTO: 2.85 X10(3)/MCL (ref 2.1–9.2)
NEUTROPHILS NFR BLD AUTO: 63.2 %
NRBC BLD AUTO-RTO: 0 %
PLATELET # BLD AUTO: 238 X10(3)/MCL (ref 130–400)
PMV BLD AUTO: 9.4 FL (ref 7.4–10.4)
POTASSIUM SERPL-SCNC: 3.5 MMOL/L (ref 3.5–5.1)
PROT SERPL-MCNC: 7.3 GM/DL (ref 5.8–7.6)
RBC # BLD AUTO: 4.19 X10(6)/MCL (ref 4.2–5.4)
SODIUM SERPL-SCNC: 139 MMOL/L (ref 136–145)
TRIGL SERPL-MCNC: 67 MG/DL (ref 37–140)
TSH SERPL-ACNC: 0.78 UIU/ML (ref 0.35–4.94)
VLDLC SERPL CALC-MCNC: 13 MG/DL
WBC # BLD AUTO: 4.51 X10(3)/MCL (ref 4.5–11.5)

## 2024-11-14 PROCEDURE — 80061 LIPID PANEL: CPT | Performed by: FAMILY MEDICINE

## 2024-11-14 PROCEDURE — 82306 VITAMIN D 25 HYDROXY: CPT | Performed by: FAMILY MEDICINE

## 2024-11-14 PROCEDURE — 36415 COLL VENOUS BLD VENIPUNCTURE: CPT | Performed by: FAMILY MEDICINE

## 2024-11-14 PROCEDURE — 80053 COMPREHEN METABOLIC PANEL: CPT | Performed by: FAMILY MEDICINE

## 2024-11-14 PROCEDURE — 84443 ASSAY THYROID STIM HORMONE: CPT | Performed by: FAMILY MEDICINE

## 2024-11-14 PROCEDURE — 99214 OFFICE O/P EST MOD 30 MIN: CPT | Mod: PBBFAC | Performed by: FAMILY MEDICINE

## 2024-11-14 PROCEDURE — 83036 HEMOGLOBIN GLYCOSYLATED A1C: CPT | Performed by: FAMILY MEDICINE

## 2024-11-14 PROCEDURE — 85025 COMPLETE CBC W/AUTO DIFF WBC: CPT | Performed by: FAMILY MEDICINE

## 2024-11-14 RX ORDER — DONEPEZIL HYDROCHLORIDE 10 MG/1
10 TABLET, FILM COATED ORAL NIGHTLY
Qty: 90 TABLET | Refills: 3 | Status: SHIPPED | OUTPATIENT
Start: 2024-11-22 | End: 2025-11-22

## 2024-11-14 RX ORDER — DONEPEZIL HYDROCHLORIDE 5 MG/1
5 TABLET, FILM COATED ORAL NIGHTLY
Qty: 7 TABLET | Refills: 0 | Status: SHIPPED | OUTPATIENT
Start: 2024-11-14 | End: 2024-11-21

## 2024-11-14 NOTE — PROGRESS NOTES
"[facility] GERIATRIC MEDICINE  OFFICE VISIT NOTE    SUBJECTIVE:      HPI: Shelby Larios is a 74 y.o. yo female w/ PMH of HTN, CKD, bilateral knee OA, VIC (on CPAP) and frequent falls, who presents today for follow-up visit, last seen in Geriatric clinic on 7/19/2023. She was recently seen in FM clinic on 11/12/2024 due to epidermal cyst on forehead and will follow-up with FM clinic in January 2025. At this time, she states she continues to have memory issues as evidenced by forgetting simple tasks on a daily basis and forgetting where she is frequently. She states that she has no issues with daily activities including cooking, personal hygiene, and making phone calls. Per patient's , she would forget to turn off the stove occasionally and that he has to accompany her with most daily activities. Denies any signs of depression and has been able to sleep well at night.     Social history: Brief tobacco use around age 11-12; drinks 4-5 beers daily since age 20; denies illicit drug use  Family history: Father (Alzheimer's disease)    Activities of daily living:  -Living situation: lives with   -Able to drive: Stopped in 2022 d/t forgetfulness  -Independent with activities: Yes   -Recent falls: No  -Recent change in appetite: No  -Bowel/urinary incontinence issues: Occasional stress incontinence (1-2 times/month)    ROS:  (+) Frustration, forgetfulness  (-) Aggressiveness, combativeness, shortness of breath, change in appetite, unintentional weight loss, audio/visual hallucination, speech impairment, depression    OBJECTIVE:     Vital signs:   /70 (BP Location: Left arm, Patient Position: Sitting)   Pulse 79   Temp 97.7 °F (36.5 °C) (Oral)   Resp 20   Ht 5' 1.81" (1.57 m)   Wt 98.1 kg (216 lb 4.3 oz)   SpO2 99%   BMI 39.80 kg/m²      Physical Examination:  General: Obese w/o distress  HEENT: NC/AT; PERRL; nasal and oral mucosa moist and clear  Pulm: CTA bilaterally, normal work of breathing  CV: " S1, S2 w/o murmurs or gallops; no edema noted  GI: Soft with normal bowel sounds in all quadrants  MSK: Full ROM of all extremities   Neuro: AAOx3; motor/sensory function intact  Psych: Cooperative; appropriate mood and affect    Current Outpatient Medications   Medication Instructions    acetaminophen (TYLENOL) 1,000 mg, Oral, Every 8 hours PRN    aspirin 81 mg, Daily    atorvastatin (LIPITOR) 20 mg, Oral    lisinopriL 10 mg, Oral      ASSESSMENT & PLAN:     Cognitive impairment  Impaired memory  -SLUMS examination on 11/14/2024 with a score of 9 out of 30; previous score of 11 on 7/19/2023  -Patient's functional status remains intact  -Shared decision making with patient and her , will start Aricept 5mg QHS for 7 days followed by 10mg QHS    Active alcohol use  -Drinks 4-5 beers daily since age 20  -Instructed on the importance of cutting down on alcohol intake    Obesity  -Instructed on the health risks associated with obesity and lifestyle modifications    HTN  -Normotensive at this time  -Continue Lisinopril 10mg daily    HLD  -Lipid panel (11/16/2023): LDL 92, triglyceride 69, HDL 90, T.cholesterol 196  -Continue atorvastatin 20mg QHS    VIC  -Per PCP, patient was non-compliant with CPAP and machine was returned  -Instructed patient to speak with PCP regarding repeating sleep study as untreated VIC may contribute to memory dysfunction    Health maintenance:  -Influenza vaccine 11/14/2024  -Mammogram and DXA scan completed on 11/12/2024    Return to clinic in 6 months. F/U on Labs (CMP, CBC, lipid panel, A1c, vitamin D, TSH), brain MRI, and response to Aricept.      Lissette Gonzales DO   [facility] Internal Medicine, PGY-III

## 2024-12-02 ENCOUNTER — TELEPHONE (OUTPATIENT)
Dept: INTERNAL MEDICINE | Facility: CLINIC | Age: 74
End: 2024-12-02
Payer: MEDICARE

## 2024-12-02 ENCOUNTER — HOSPITAL ENCOUNTER (OUTPATIENT)
Dept: RADIOLOGY | Facility: HOSPITAL | Age: 74
Discharge: HOME | End: 2024-12-02
Payer: MEDICARE

## 2024-12-02 DIAGNOSIS — R41.3 MEMORY LOSS: ICD-10-CM

## 2024-12-02 PROCEDURE — 70470 CT HEAD/BRAIN W/O & W/DYE: CPT | Mod: TC

## 2024-12-02 PROCEDURE — 25500020 PHARM REV CODE 255

## 2024-12-02 RX ADMIN — IOHEXOL 100 ML: 350 INJECTION, SOLUTION INTRAVENOUS at 12:12

## 2024-12-02 NOTE — TELEPHONE ENCOUNTER
----- Message from KAREL Coleman sent at 12/2/2024  1:01 PM CST -----  Please call pt daughter and inform her that the CT showed some age related changes that could be related to her mother's memory loss. I previously placed a referral to the family medicine geriatric clinic who manage this type of memory loss- it looks like they are scheduled for May 15, 2025. Our follow up remains the same as scheduled in January.     Thanks,   KAREL Coleman

## 2024-12-02 NOTE — TELEPHONE ENCOUNTER
Spoke with pt daughter sandoval regarding ct scan results, and follow up with geriatric family medicine. She verbalized understanding.

## 2024-12-05 ENCOUNTER — HOSPITAL ENCOUNTER (OUTPATIENT)
Dept: RADIOLOGY | Facility: HOSPITAL | Age: 74
Discharge: HOME | End: 2024-12-05
Attending: STUDENT IN AN ORGANIZED HEALTH CARE EDUCATION/TRAINING PROGRAM
Payer: MEDICARE

## 2024-12-05 DIAGNOSIS — R41.3 IMPAIRED MEMORY: ICD-10-CM

## 2024-12-05 DIAGNOSIS — R41.3 MEMORY LOSS: ICD-10-CM

## 2024-12-05 DIAGNOSIS — R41.3 OTHER AMNESIA: ICD-10-CM

## 2024-12-05 PROCEDURE — 70551 MRI BRAIN STEM W/O DYE: CPT | Mod: TC

## 2024-12-24 ENCOUNTER — HOSPITAL ENCOUNTER (EMERGENCY)
Facility: HOSPITAL | Age: 74
Discharge: HOME | End: 2024-12-24
Attending: EMERGENCY MEDICINE
Payer: MEDICARE

## 2024-12-24 VITALS
SYSTOLIC BLOOD PRESSURE: 177 MMHG | DIASTOLIC BLOOD PRESSURE: 86 MMHG | BODY MASS INDEX: 34.72 KG/M2 | OXYGEN SATURATION: 100 % | TEMPERATURE: 98 F | WEIGHT: 216.06 LBS | RESPIRATION RATE: 18 BRPM | HEIGHT: 66 IN | HEART RATE: 55 BPM

## 2024-12-24 DIAGNOSIS — Z76.0 ENCOUNTER FOR MEDICATION REFILL: Primary | ICD-10-CM

## 2024-12-24 PROCEDURE — 99284 EMERGENCY DEPT VISIT MOD MDM: CPT

## 2024-12-24 RX ORDER — ATORVASTATIN CALCIUM 20 MG/1
20 TABLET, FILM COATED ORAL NIGHTLY
Qty: 90 TABLET | Refills: 0 | Status: SHIPPED | OUTPATIENT
Start: 2024-12-24

## 2024-12-24 RX ORDER — ATORVASTATIN CALCIUM 10 MG/1
20 TABLET, FILM COATED ORAL NIGHTLY
Qty: 180 TABLET | Refills: 3 | Status: SHIPPED | OUTPATIENT
Start: 2024-12-24 | End: 2025-12-24

## 2024-12-24 NOTE — ED PROVIDER NOTES
ED PROVIDER NOTE  12/24/2024    CHIEF COMPLAINT:   Chief Complaint   Patient presents with    Dizziness     Pt reports intermittent dizziness when laying down. No other problems reported at this time.        HISTORY OF PRESENT ILLNESS:   Shelby Larios is a 74 y.o. female who presents with chief complaint Lightheadedness.  Patient reports lightheadedness over the past days since running out of her blood pressure medication.  Which she states she has had this happen before whenever she has ran out of her blood pressure medication.  Denies headache, spinning sensation, numbness or weakness in extremities.    The history is provided by the patient.         REVIEW OF SYSTEMS: as noted in the HPI.  NURSING NOTES REVIEWED      PAST MEDICAL/SURGICAL HISTORY:   Past Medical History:   Diagnosis Date    Hypertension     Personal history of colonic polyps 12/12/2017      Past Surgical History:   Procedure Laterality Date    COLONOSCOPY W/ POLYPECTOMY  12/12/2017    Dr. Jared Nesbitt    TUBAL LIGATION         FAMILY HISTORY:   Family History   Family history unknown: Yes       SOCIAL HISTORY:   Social History     Tobacco Use    Smoking status: Never     Passive exposure: Never    Smokeless tobacco: Never   Substance Use Topics    Alcohol use: Yes     Alcohol/week: 10.0 standard drinks of alcohol     Types: 10 Cans of beer per week     Comment: weekends    Drug use: Never       ALLERGIES: Review of patient's allergies indicates:  No Known Allergies    PHYSICAL EXAM:  Initial Vitals [12/24/24 0841]   BP Pulse Resp Temp SpO2   132/86 76 18 98.4 °F (36.9 °C) 99 %      MAP       --         Physical Exam    Nursing note and vitals reviewed.  Constitutional: She appears well-developed and well-nourished.   HENT:   Head: Normocephalic and atraumatic. Mouth/Throat: Uvula is midline and mucous membranes are normal.   Eyes: EOM are normal. Pupils are equal, round, and reactive to light.   Neck: Trachea normal. Neck supple.    Cardiovascular:  Normal rate, regular rhythm and normal pulses.           Pulmonary/Chest: Effort normal and breath sounds normal.   Abdominal: Abdomen is soft. Bowel sounds are normal. There is no rebound and no guarding.   Musculoskeletal:         General: Normal range of motion.      Cervical back: Neck supple.     Neurological: She is alert and oriented to person, place, and time. GCS eye subscore is 4. GCS verbal subscore is 5. GCS motor subscore is 6.   Skin: Skin is warm and dry.   Psychiatric: She has a normal mood and affect. Her speech is normal. Thought content normal.         RESULTS:  Labs Reviewed - No data to display  Imaging Results    None         PROCEDURES:  Procedures    ECG:       ED COURSE AND MEDICAL DECISION MAKING:  Medications - No data to display        Medical Decision Making  Well-appearing 74-year-old female who presents with essentially drug refill.  We will refill her atorvastatin and lisinopril.  Instructed to follow up with the PCP.  Given strict ED return precautions. I have spoken with the patient and/or caregivers. I have explained the patient's condition, diagnoses and treatment plan based on the information available to me at this time. I have answered the patient's and/or caregiver's questions and addressed any concerns. The patient and/or caregivers have as good an understanding of the patient's diagnosis, condition and treatment plan as can be expected at this point. The vital signs have been stable. The patient's condition is stable and appropriate for discharge from the emergency department.     The patient will pursue further outpatient evaluation with the primary care physician or other designated or consulting physician as outlined in the discharge instructions. The patient and/or caregivers are agreeable to this plan of care and follow-up instructions have been explained in detail. The patient and/or caregivers have received these instructions in written format and  have expressed an understanding of the discharge instructions. The patient and/or caregivers are aware that any significant change in condition or worsening of symptoms should prompt an immediate return to this or the closest emergency department or a call to 911.    Risk  Prescription drug management.        CLINICAL IMPRESSION:  1. Encounter for medication refill        DISPOSITION:   ED Disposition Condition    Discharge Stable            ED Prescriptions       Medication Sig Dispense Start Date End Date Auth. Provider    atorvastatin (LIPITOR) 20 MG tablet Take 1 tablet (20 mg total) by mouth every evening. 90 tablet 12/24/2024 -- Boyd Muhammad DO    atorvastatin (LIPITOR) 10 MG tablet Take 2 tablets (20 mg total) by mouth every evening. 180 tablet 12/24/2024 12/24/2025 Boyd Muhammad DO          Follow-up Information       Follow up With Specialties Details Why Contact Info    Mattie Buck, FNP Internal Medicine Schedule an appointment as soon as possible for a visit   [address] [facility] [address]  [phone]      [facility] Emergency Medicine  If symptoms worsen [address] [facility] [id number] [phone]               Boyd Muhammad DO  12/25/24 0881

## 2024-12-26 DIAGNOSIS — I10 PRIMARY HYPERTENSION: Primary | ICD-10-CM

## 2024-12-26 RX ORDER — LISINOPRIL 10 MG/1
10 TABLET ORAL DAILY
Qty: 90 TABLET | Refills: 0 | Status: SHIPPED | OUTPATIENT
Start: 2024-12-26

## 2025-01-07 ENCOUNTER — OFFICE VISIT (OUTPATIENT)
Dept: FAMILY MEDICINE | Facility: CLINIC | Age: 75
End: 2025-01-07
Payer: MEDICARE

## 2025-01-07 VITALS
BODY MASS INDEX: 35.23 KG/M2 | WEIGHT: 219.19 LBS | TEMPERATURE: 98 F | HEART RATE: 88 BPM | HEIGHT: 66 IN | SYSTOLIC BLOOD PRESSURE: 143 MMHG | DIASTOLIC BLOOD PRESSURE: 82 MMHG | RESPIRATION RATE: 20 BRPM | OXYGEN SATURATION: 100 %

## 2025-01-07 DIAGNOSIS — L72.0 EPIDERMAL INCLUSION CYST: Primary | ICD-10-CM

## 2025-01-07 PROCEDURE — 99213 OFFICE O/P EST LOW 20 MIN: CPT | Mod: PBBFAC

## 2025-01-07 PROCEDURE — 11442 EXC FACE-MM B9+MARG 1.1-2 CM: CPT | Mod: PBBFAC | Performed by: FAMILY MEDICINE

## 2025-01-07 RX ORDER — LIDOCAINE HYDROCHLORIDE 10 MG/ML
5 INJECTION, SOLUTION EPIDURAL; INFILTRATION; INTRACAUDAL; PERINEURAL
Status: COMPLETED | OUTPATIENT
Start: 2025-01-07 | End: 2025-01-07

## 2025-01-07 RX ADMIN — LIDOCAINE HYDROCHLORIDE 50 MG: 10 INJECTION, SOLUTION EPIDURAL; INFILTRATION; INTRACAUDAL; PERINEURAL at 10:01

## 2025-01-07 NOTE — PROGRESS NOTES
Subjective:       Patient ID: Shelby Larios is a 74 y.o. female.    Chief Complaint: Follow-up (Epidermal inclusion cyst to superior aspect of scalp)    HPI  73 yo returns to minor surgery for a cyst on the superior aspect of her forehead. No significant changes other than occasional drainage. No recent infections. Pt reports some irritation to having the lesion and desires removal    Review of Systems  As per HPI         Objective:      Vitals:    01/07/25 0936   BP: (!) 143/82   Pulse: 88   Resp: 20   Temp: 97.8 °F (36.6 °C)       Physical Exam    Gen: alert, no acute distress  Skin: 1.5 cm cystic lesion to upper forehead with central punctum present  Psych: cooperative, appropriate mood and affect    Procedure Note:  Procedure: epidermal inclusion cyst excision  Performed by: Macrina Ramirez MD  Consent: signed consent obtained after discussion of risks, benefits, and alternative treatments. Time out completed  Description: Area cleaned with alcohol. 1% lidocaine used for anesthesia. Chloraprep used to clean area again. An incision was made with a 3mm circular blade over the central punctum. The cyst wall and contents were removed with forceps and curved iris scissors. Gel foam placed into incision. EBL <3mL.   The patient tolerated the procedure well with no complications.       Assessment/Plan:  Epidermal inclusion cyst    Other orders  -     LIDOcaine (PF) 10 mg/ml (1%) injection 50 mg    - excision today  - Post care instructions and return precautions discussed.        Follow up if symptoms worsen or fail to improve.

## 2025-03-24 DIAGNOSIS — I10 PRIMARY HYPERTENSION: ICD-10-CM

## 2025-03-24 RX ORDER — LISINOPRIL 10 MG/1
10 TABLET ORAL DAILY
Qty: 90 TABLET | Refills: 0 | Status: SHIPPED | OUTPATIENT
Start: 2025-03-24

## 2025-05-15 ENCOUNTER — OFFICE VISIT (OUTPATIENT)
Dept: FAMILY MEDICINE | Facility: CLINIC | Age: 75
End: 2025-05-15
Payer: MEDICARE

## 2025-05-15 ENCOUNTER — PATIENT OUTREACH (OUTPATIENT)
Facility: CLINIC | Age: 75
End: 2025-05-15
Payer: MEDICARE

## 2025-05-15 VITALS
DIASTOLIC BLOOD PRESSURE: 78 MMHG | OXYGEN SATURATION: 97 % | BODY MASS INDEX: 34.87 KG/M2 | HEIGHT: 66 IN | WEIGHT: 217 LBS | SYSTOLIC BLOOD PRESSURE: 128 MMHG | HEART RATE: 66 BPM | TEMPERATURE: 98 F

## 2025-05-15 DIAGNOSIS — N18.30 STAGE 3 CHRONIC KIDNEY DISEASE, UNSPECIFIED WHETHER STAGE 3A OR 3B CKD: Primary | ICD-10-CM

## 2025-05-15 DIAGNOSIS — G47.33 OSA (OBSTRUCTIVE SLEEP APNEA): ICD-10-CM

## 2025-05-15 DIAGNOSIS — F01.C0 SEVERE VASCULAR DEMENTIA WITHOUT BEHAVIORAL DISTURBANCE, PSYCHOTIC DISTURBANCE, MOOD DISTURBANCE, OR ANXIETY: ICD-10-CM

## 2025-05-15 DIAGNOSIS — E55.9 VITAMIN D DEFICIENCY: ICD-10-CM

## 2025-05-15 LAB
ANION GAP SERPL CALC-SCNC: 10 MEQ/L
BUN SERPL-MCNC: 13.6 MG/DL (ref 9.8–20.1)
CALCIUM SERPL-MCNC: 9.3 MG/DL (ref 8.4–10.2)
CHLORIDE SERPL-SCNC: 107 MMOL/L (ref 98–107)
CO2 SERPL-SCNC: 22 MMOL/L (ref 23–31)
CREAT SERPL-MCNC: 1.16 MG/DL (ref 0.55–1.02)
CREAT/UREA NIT SERPL: 12
GFR SERPLBLD CREATININE-BSD FMLA CKD-EPI: 50 ML/MIN/1.73/M2
GLUCOSE SERPL-MCNC: 74 MG/DL (ref 82–115)
POTASSIUM SERPL-SCNC: 4.6 MMOL/L (ref 3.5–5.1)
SODIUM SERPL-SCNC: 139 MMOL/L (ref 136–145)

## 2025-05-15 PROCEDURE — 80048 BASIC METABOLIC PNL TOTAL CA: CPT

## 2025-05-15 PROCEDURE — 99213 OFFICE O/P EST LOW 20 MIN: CPT | Mod: PBBFAC

## 2025-05-15 RX ORDER — ASPIRIN 325 MG
50000 TABLET, DELAYED RELEASE (ENTERIC COATED) ORAL
Qty: 5 CAPSULE | Refills: 0 | Status: SHIPPED | OUTPATIENT
Start: 2025-05-15 | End: 2025-06-13

## 2025-05-15 NOTE — PROGRESS NOTES
Health Maintenance Topic(s) Outreach Outcomes & Actions Taken:    Primary Care Appt - Outreach Outcomes & Actions Taken  : PCP appt no show 1/29/2025 and [facility] appt canc 5/15/2025. Letter mailed to call to reschedule.    Blood Pressure - Outreach Outcomes & Actions Taken  : Letter mailed for remote BP    Medication Adherence / Statins - Outreach Outcomes & Actions Taken  : Letter mailed encouraging meds as prescribed.      Additional Notes:  Annual Wellness Visit: Due     Next PCP F/U: No show 1/29/25. Letter mailed.  [facility] review due- financial, transportation & food   Health Maintenance Topics Overdue:  VBHM Score: 2   Colon Cancer Screening- referral per pcp to Dr Hess.    Uncontrolled BP- NON compliance per dispense history. Amlodipine last filled 7/11/24, lisinopril last filled 12/26/24.        Statin Therapy for prevention of CVD: NON compliance per dispense history. Atorvastatin last filled 12/24/2024.        Care Management, Digital Medicine, and/or Education Referrals      Next Steps - Referral Actions: Digital Medicine Outcomes and Actions Taken: Pt Declined or Not Eligible

## 2025-05-15 NOTE — PROGRESS NOTES
[facility] GERIATRIC MEDICINE  OFFICE VISIT NOTE    SUBJECTIVE:      HPI: Shelby Larios is a 74 y.o. yo female w/ PMH of HTN, CKD, bilateral knee OA, VIC (on CPAP) and frequent falls, who presents today for follow-up visit, last seen in Geriatric clinic on 7/19/2023. She was recently seen in FM clinic on 11/12/2024 due to epidermal cyst on forehead and will follow-up with FM clinic in January 2025. At this time, she states she continues to have memory issues as evidenced by forgetting simple tasks on a daily basis and forgetting where she is frequently. Frequently forgets to take meds and  also forgets to remind her. She is not using CPAP still. Previously patient had SLUMS score decrease from 11 to 9 in span of one year, so was started on aricept 5mg and escalated to 10 mg; has not taken consistently at all. Today, re-administered SLUMS with new score of 6. Patient reports that her overall memory has not changed, but she remains with impairment and frequently forgets things, and has worsened SLUMS score, as well as family reporting worsened memory. She is able to do basic ADLs like dressing self and going to restroom. Meals are primarily delivered by daughter 2-3x per week, and  occasionally cooks. She denies any feelings of depression or sleep trouble. Patient denies any incontinence. She reports continued drinking of 5-6 beers daily. Advised to stop this and spoke to family about having more members watch patient and assist with living and meds. Patient currently getting approved for financial assistance and patient's family in agreement that she may need help long term as well. Will try to get patient on food plan to allow regular meals, and to prevent patient from having to cook.The patient denies headaches, changes in vision, nausea, emesis, fevers, chills, chest pain, palpitations, dyspnea, abdominal pain, or changes in urinary or bowel habits.       Social history: Brief tobacco use around age  "[age]; drinks 4-5 beers daily since age 20; denies illicit drug use  Family history: Father (Alzheimer's disease)    Activities of daily living:  -Living situation: lives with   -Able to drive: Stopped in 2022 d/t forgetfulness  -Independent with activities: Yes   -Recent falls: No  -Recent change in appetite: No  -Bowel/urinary incontinence issues: Occasional stress incontinence (1-2 times/month)    ROS:  (+) Frustration, forgetfulness  (-) Aggressiveness, combativeness, shortness of breath, change in appetite, unintentional weight loss, audio/visual hallucination, speech impairment, depression    OBJECTIVE:     Vital signs:   /78 (BP Location: Left arm, Patient Position: Sitting)   Pulse 66   Temp 98.1 °F (36.7 °C) (Oral)   Ht 5' 6" (1.676 m)   Wt 98.4 kg (217 lb)   SpO2 97%   BMI 35.02 kg/m²      Physical Examination:  General: Obese w/o distress  HEENT: NC/AT; PERRL; nasal and oral mucosa moist and clear  Pulm: CTA bilaterally, normal work of breathing  CV: S1, S2 w/o murmurs or gallops; no edema noted  GI: Soft with normal bowel sounds in all quadrants  MSK: Full ROM of all extremities   Neuro: AAOx3; motor/sensory function intact  Psych: Cooperative; appropriate mood and affect    Current Outpatient Medications   Medication Instructions    acetaminophen (TYLENOL) 1,000 mg, Oral, Every 8 hours PRN    aspirin 81 mg, Daily    atorvastatin (LIPITOR) 20 mg, Oral, Nightly    donepeziL (ARICEPT) 10 mg, Oral, Nightly    lisinopriL 10 mg, Oral, Daily      ASSESSMENT & PLAN:     Cognitive impairment  Impaired memory  -SLUMS examination on 11/14/2024 with a score of 9 out of 30; previous score of 11 on 7/19/2023;  now score of 6 out of 30 on 05/2025.  -Patient's functional status remains intact  -Shared decision making with patient and her  and daughter  - will advise strong cessation of alcohol  - will advise patient's family to become much more involved  - will set up with meal's on wheels  - " holding aricept for now in context of patient's heavy drinking    Vitamin D Deficiency  - starting vitamin D 50k U; take 1 dose qweek for 5 weeks    Active alcohol use  -Drinks 4-5 beers daily since age 20  -Instructed on the importance of cutting down on alcohol intake    Obesity  -Instructed on the health risks associated with obesity and lifestyle modifications    HTN  -Normotensive at this time, controlled  -Continue Lisinopril 10mg daily    HLD  -Lipid panel (12/2024): LDL 60s  -Continue atorvastatin 20mg QHS    VIC  -Per PCP, patient was non-compliant with CPAP and machine was returned  -Instructed patient to speak with PCP regarding repeating sleep study as untreated VIC may contribute to memory dysfunction  - will re-refer to sleep lab    CKD3  - Cr 1.2-1.3  - eGFR 44, mildly worsened  - will repeat BMP      Health maintenance:  -Influenza vaccine 11/14/2024  -Mammogram and DXA scan completed on 11/12/2024    Return to clinic in 3 months. F/U on Labs ([facility]), will get repeat sleep study to approve CPAP; setting up meals on wheels; ongoing family discussion about patient's current living situation    Surinder Arnold MD   [facility] Internal Medicine, PGY-1

## 2025-05-15 NOTE — LETTER
Dear Ella [facility] is committed to your overall health. Periodically we review the health information in your chart to make sure you are up to date.    Our review of your chart shows that your Blood pressure and Cholesterol medications are not being filled regularly.    Your health and well being are very important to us. This is a friendly reminder to take your medications daily as prescribed. Please contact your pharmacy and refill these very important medications.       If there is anything else we can do to help you. Please respond to this message via your patient portal or give me a call at [phone]. Thank you for choosing [facility] & have a great day!      Sincerely,    FAVIOLA Garcia Care Coordinator  Mattie Buck FNP and your [facility] Primary Care Team

## 2025-05-15 NOTE — LETTER
Dear Shelby    Your health and well being are very important to us.     Our records indicate that your last primary care appointment with Mattie Buck FNP was on 10/29/2024. You missed an appointment on 1/29/2025. Please contact the clinic at [phone] to reschedule your appointment.     Your last appointment with the [facility] was on 1/7/2025. You cancelled an appointment on 5/15/2025. Please contact the clinic at [phone] to reschedule your appointment.     If you would like, I may be able to help schedule these appointments. Please reply to this message in your patient portal or give me a call at [phone].     Thank you for choosing [facility] & have a great day!     FAVIOLA Garcia Care Coordinator  Mattie Buck FNP and your Primary Health Care Team

## 2025-05-15 NOTE — LETTER
Shelby    We are reaching out to you about your blood pressure. When you were last seen, your blood pressure was a little elevated. 143/82.     If you have a blood pressure monitor at home, please check your blood pressure and reply to this message with your blood pressure reading through your patient portal or give me a call at [phone]. The blood pressure reading will be sent to your provider and we will update your chart. I encourage you to please take your BP reading daily and keep a log to bring with you at your next visit.     Thank you for choosing [facility] & have a great day!     Sincerely,    FAVIOLA Garcia Care Coordinator  Mattie Buck FNP and your [facility] Primary Care Team

## 2025-05-22 ENCOUNTER — TELEPHONE (OUTPATIENT)
Dept: FAMILY MEDICINE | Facility: CLINIC | Age: 75
End: 2025-05-22
Payer: MEDICARE

## 2025-05-22 NOTE — TELEPHONE ENCOUNTER
5/22/25    LCSW received a referral requesting assistance navigating Meals on Wheels referral. LCSW was instructed to contact patient's daughter. LCSW attempted to contact patient's daughter, Sonya, but there was no answer. LCSW left a VM and will make another attempt to reach Sonya at a later date.     LCSW received a return call from Sonya who reports that she is interested in applying for MOW for patient and her , Александр Larios. Currently, patient's children take turns cooking meals and bringing those meals to patient's home. Both patient and her  could benefit from this service. LCSW contacted [facility] to make a referral. LCSW had to leave a VM. LCSW will make another attempt to reach COA at a later date.      Sonya also reports applying to the [employer] Waiver program. Patient was approved for the program but has been placed on a waiting list for services with no expected length of time patient will be on the waitlist. LCSW provided brief education on other care support options including LTPCS waiver, Adult Day Waiver, and PACE program. Sonya requested information be emailed to her at [email or web address]. Sonya denied further unmet needs at this time and was encouraged to contact LCSW if further unmet needs arise.

## 2025-06-25 DIAGNOSIS — I10 PRIMARY HYPERTENSION: ICD-10-CM

## 2025-06-25 RX ORDER — LISINOPRIL 10 MG/1
10 TABLET ORAL DAILY
Qty: 90 TABLET | Refills: 1 | Status: SHIPPED | OUTPATIENT
Start: 2025-06-25

## 2025-06-26 DIAGNOSIS — I10 PRIMARY HYPERTENSION: ICD-10-CM

## 2025-06-26 RX ORDER — LISINOPRIL 10 MG/1
10 TABLET ORAL DAILY
Qty: 90 TABLET | Refills: 1 | OUTPATIENT
Start: 2025-06-26